# Patient Record
Sex: FEMALE | Race: BLACK OR AFRICAN AMERICAN | NOT HISPANIC OR LATINO | Employment: FULL TIME | URBAN - NONMETROPOLITAN AREA
[De-identification: names, ages, dates, MRNs, and addresses within clinical notes are randomized per-mention and may not be internally consistent; named-entity substitution may affect disease eponyms.]

---

## 2017-02-27 ENCOUNTER — OFFICE VISIT (OUTPATIENT)
Dept: RETAIL CLINIC | Facility: CLINIC | Age: 27
End: 2017-02-27

## 2017-02-27 VITALS
BODY MASS INDEX: 24.12 KG/M2 | OXYGEN SATURATION: 98 % | HEIGHT: 65 IN | DIASTOLIC BLOOD PRESSURE: 72 MMHG | TEMPERATURE: 98.6 F | WEIGHT: 144.8 LBS | RESPIRATION RATE: 18 BRPM | SYSTOLIC BLOOD PRESSURE: 122 MMHG | HEART RATE: 88 BPM

## 2017-02-27 DIAGNOSIS — J10.1 INFLUENZA B: Primary | ICD-10-CM

## 2017-02-27 LAB
EXPIRATION DATE: ABNORMAL
FLUAV AG NPH QL: NEGATIVE
FLUBV AG NPH QL: POSITIVE
INTERNAL CONTROL: ABNORMAL
Lab: ABNORMAL

## 2017-02-27 PROCEDURE — 87804 INFLUENZA ASSAY W/OPTIC: CPT | Performed by: NURSE PRACTITIONER

## 2017-02-27 PROCEDURE — 99213 OFFICE O/P EST LOW 20 MIN: CPT | Performed by: NURSE PRACTITIONER

## 2017-02-27 RX ORDER — DEXTROMETHORPHAN HYDROBROMIDE AND PROMETHAZINE HYDROCHLORIDE 15; 6.25 MG/5ML; MG/5ML
5 SYRUP ORAL NIGHTLY PRN
Qty: 45 ML | Refills: 0 | Status: SHIPPED | OUTPATIENT
Start: 2017-02-27

## 2017-02-27 NOTE — PATIENT INSTRUCTIONS
"Influenza, Adult  Influenza (\"the flu\") is a viral infection of the respiratory tract. It occurs more often in winter months because people spend more time in close contact with one another. Influenza can make you feel very sick. Influenza easily spreads from person to person (contagious).  CAUSES   Influenza is caused by a virus that infects the respiratory tract. You can catch the virus by breathing in droplets from an infected person's cough or sneeze. You can also catch the virus by touching something that was recently contaminated with the virus and then touching your mouth, nose, or eyes.  RISKS AND COMPLICATIONS  You may be at risk for a more severe case of influenza if you smoke cigarettes, have diabetes, have chronic heart disease (such as heart failure) or lung disease (such as asthma), or if you have a weakened immune system. Elderly people and pregnant women are also at risk for more serious infections. The most common problem of influenza is a lung infection (pneumonia). Sometimes, this problem can require emergency medical care and may be life threatening.  SIGNS AND SYMPTOMS   Symptoms typically last 4 to 10 days and may include:  · Fever.  · Chills.  · Headache, body aches, and muscle aches.  · Sore throat.  · Chest discomfort and cough.  · Poor appetite.  · Weakness or feeling tired.  · Dizziness.  · Nausea or vomiting.  DIAGNOSIS   Diagnosis of influenza is often made based on your history and a physical exam. A nose or throat swab test can be done to confirm the diagnosis.  TREATMENT   In mild cases, influenza goes away on its own. Treatment is directed at relieving symptoms. For more severe cases, your health care provider may prescribe antiviral medicines to shorten the sickness. Antibiotic medicines are not effective because the infection is caused by a virus, not by bacteria.  HOME CARE INSTRUCTIONS  · Take medicines only as directed by your health care provider.  · Use a cool mist humidifier " to make breathing easier.  · Get plenty of rest until your temperature returns to normal. This usually takes 3 to 4 days.  · Drink enough fluid to keep your urine clear or pale yellow.  · Cover your mouth and nose when coughing or sneezing, and wash your hands well to prevent the virus from spreading.  · Stay home from work or school until the fever is gone for at least 1 full day.  PREVENTION   An annual influenza vaccination (flu shot) is the best way to avoid getting influenza. An annual flu shot is now routinely recommended for all adults in the [place]  SEEK MEDICAL CARE IF:  · You experience chest pain, your cough worsens, or you produce more mucus.  · You have nausea, vomiting, or diarrhea.  · Your fever returns or gets worse.  SEEK IMMEDIATE MEDICAL CARE IF:  · You have trouble breathing, you become short of breath, or your skin or nails become bluish.  · You have severe pain or stiffness in the neck.  · You develop a sudden headache, or pain in the face or ear.  · You have nausea or vomiting that you cannot control.  MAKE SURE YOU:   · Understand these instructions.  · Will watch your condition.  · Will get help right away if you are not doing well or get worse.     This information is not intended to replace advice given to you by your health care provider. Make sure you discuss any questions you have with your health care provider.     Document Released: 12/15/2001 Document Revised: 01/08/2016 Document Reviewed: 10/11/2016  [employer] Interactive Patient Education ©2016 [employer]    Dextromethorphan; Promethazine oral solution  What is this medicine?  DEXTROMETHORPHAN; PROMETHAZINE (dex troe meth OR fan; proe METH a zeen) is a cough suppressant and an antihistamine. It is used to treat coughing due to colds or allergies. This medicine will not treat an infection.  This medicine may be used for other purposes; ask your health care provider or pharmacist if you have questions.  COMMON BRAND NAME(S): Phen  Cynthia YOUNGER  What should I tell my health care provider before I take this medicine?  They need to know if you have any of the following conditions:  -asthma or other lung disease  -diabetes  -eczema  -seizure disorder  -serious or chronic illness  -sleep apnea  -an unusual or allergic reaction to dextromethorphan, promethazine, phenothiazines, other medicines, foods, dyes, or preservatives  -pregnant or trying to get pregnant  -breast-feeding  How should I use this medicine?  Take this medicine by mouth with a glass of water. Follow the directions on the prescription label. Use a specially marked spoon or container to measure your medicine. Household spoons are not accurate. Take your doses at regular intervals. Do not take your medicine more often than directed.  Talk to your pediatrician regarding the use of this medicine in children. Special care may be needed. Do not use this medicine in children less than 2 years of age.  Patients over 65 years old may have a stronger reaction and need a smaller dose.  Overdosage: If you think you have taken too much of this medicine contact a poison control center or emergency room at once.  NOTE: This medicine is only for you. Do not share this medicine with others.  What if I miss a dose?  If you miss a dose, take it as soon as you can. If it is almost time for your next dose, take only that dose. Do not take double or extra doses.  What may interact with this medicine?  Do not take this medicine with any of the following medications:  -MAOIs like Carbex, Eldepryl, Marplan, Nardil, and Parnate  This medicine may also interact with the following medications:  -alcohol or alcohol-containing products  -barbiturate medicines like phenobarbital  -epinephrine  -medicines for depression, anxiety or psychotic disturbances  -medicines for Parkinson's disease  -medicines for sleep  -medicines for the stomach like metoclopramide, dicyclomine, glycopyrrolate  -pain medicines  -radio  contrast dyes  -sibutramine  -some medicines for cold or allergies  This list may not describe all possible interactions. Give your health care provider a list of all the medicines, herbs, non-prescription drugs, or dietary supplements you use. Also tell them if you smoke, drink alcohol, or use illegal drugs. Some items may interact with your medicine.  What should I watch for while using this medicine?  Tell your doctor if your symptoms do not improve or if they get worse.  You may get drowsy or dizzy. Do not drive, use machinery, or do anything that needs mental alertness until you know how this medicine affects you. Do not stand or sit up quickly, especially if you are an older patient. This reduces the risk of dizzy or fainting spells. Alcohol may interfere with the effect of this medicine. Avoid alcoholic drinks.  This medicine can make you more sensitive to the sun. Keep out of the sun. If you cannot avoid being in the sun, wear protective clothing and use sunscreen. Do not use sun lamps or tanning beds/booths.  What side effects may I notice from receiving this medicine?  Side effects that you should report to your doctor or health care professional as soon as possible:  -allergic reactions like skin rash, itching or hives, swelling of the face, lips, or tongue  -breathing problems  -changes in vision  -confused, disoriented, excitable  -fast, irregular heartbeat  -fever, sweating  -hallucinations  -high or low blood pressure  -lightheaded  -muscle stiffness  -seizures  -tremors, twitches  -yellow eyes or skin  Side effects that usually do not require medical attention (report to your doctor or health care professional if they continue or are bothersome):  -congestion in the nose  -dry mouth  -nausea, vomiting  -stomach upset  -trouble sleeping  This list may not describe all possible side effects. Call your doctor for medical advice about side effects. You may report side effects to FDA at  1-800-FDA-1088.  Where should I keep my medicine?  Keep out of the reach of children.  Store at room temperature between 20 and 25 degrees C (68 and 77 degrees F). Protect from light. Throw away any unused medicine after the expiration date.  NOTE: This sheet is a summary. It may not cover all possible information. If you have questions about this medicine, talk to your doctor, pharmacist, or health care provider.     © 2016, [employer]. (2009-04-06 17:01:49)      Your flu test was positive for flu B.  Due to your symptoms starting several days ago you are not a candidate for Tamiflu.  Continue good fluid intake and rest.  You can continue taking Mucinex Fast Max during the day and take promethazine DM at bedtime.  Use caution when taking promethazine DM as it may make you drowsy.  If you develop high fever not responding to medication, chest pain, trouble breathing or any other severe symptom please go to ER for evaluation.

## 2017-02-27 NOTE — PROGRESS NOTES
Chief Complaint   Patient presents with   • Headache   • Cough   • Nasal Congestion     Started last Wed. but just keeps getting worse   • Chills     Since Sat.    • Generalized Body Aches     Since Sat.    • Fever     Subjective   Lee Irizarry is a 26 y.o. female who presents to the clinic today with complaints of a tickle in her throat and mild cough which started Wednesday which has gradually worsened. She started having fever Thursday.  She is unsure how high her temperature has been.  She has had close contacts with the flu.     She has been taking Mucinex Fast -Max every four hours which has been helping her symptoms. She is having trouble sleeping due to cough.       The following portions of the patient's history were reviewed and updated as appropriate: allergies, past family history, past medical history, past social history, past surgical history and problem list.    Current Outpatient Prescriptions:   •  cetirizine (zyrTEC) 10 MG tablet, Take 10 mg by mouth Daily., Disp: , Rfl:   •  fluticasone (FLONASE) 50 MCG/ACT nasal spray, 2 sprays into each nostril Daily., Disp: , Rfl:   •  montelukast (SINGULAIR) 10 MG tablet, Take 10 mg by mouth Every Night., Disp: , Rfl:   •  loratadine-pseudoephedrine (CLARITIN-D 24 HOUR)  MG per 24 hr tablet, Take 1 tablet by mouth Daily., Disp: 7 tablet, Rfl: 0    Allergies:  Amoxicillin  Past Medical History   Diagnosis Date   • Allergic      History reviewed. No pertinent past surgical history.  Family History   Problem Relation Age of Onset   • Hypertension Mother    • Asthma Mother    • Hypertension Father      Social History   Substance Use Topics   • Smoking status: Never Smoker   • Smokeless tobacco: Never Used   • Alcohol use No       Review of Systems  Review of Systems   Constitutional: Positive for chills, fatigue and fever.   HENT: Positive for postnasal drip and rhinorrhea (clear). Negative for ear pain and sore throat.    Eyes: Negative for  "photophobia, pain, discharge, itching and visual disturbance.   Respiratory: Positive for cough (productive cough, yellow.). Negative for shortness of breath and wheezing.    Cardiovascular: Negative for chest pain.   Gastrointestinal: Negative for abdominal pain, constipation, diarrhea (soft stool), nausea and vomiting.   Musculoskeletal: Positive for arthralgias.   Neurological: Positive for headaches.   Psychiatric/Behavioral: Negative for confusion.       Objective   Visit Vitals   • /72 (BP Location: Right arm, Patient Position: Sitting, Cuff Size: Adult)   • Pulse 88   • Temp 98.6 °F (37 °C) (Oral)   • Resp 18   • Ht 65\" (165.1 cm)   • Wt 144 lb 12.8 oz (65.7 kg)   • LMP 02/08/2017 (Approximate)   • SpO2 98%   • BMI 24.1 kg/m2     Last 2 weights    02/27/17  1508   Weight: 144 lb 12.8 oz (65.7 kg)       Physical Exam   Constitutional: She is oriented to person, place, and time. She appears well-developed and well-nourished. She is cooperative.   HENT:   Head: Normocephalic and atraumatic.   Right Ear: Tympanic membrane, external ear and ear canal normal. Tympanic membrane is not perforated, not erythematous, not retracted and not bulging.   Left Ear: Tympanic membrane, external ear and ear canal normal. Tympanic membrane is not perforated, not erythematous, not retracted and not bulging.   Nose: Mucosal edema present. Right sinus exhibits no maxillary sinus tenderness and no frontal sinus tenderness. Left sinus exhibits no maxillary sinus tenderness and no frontal sinus tenderness.   Mouth/Throat: Uvula is midline, oropharynx is clear and moist and mucous membranes are normal. Posterior oropharyngeal erythema: mild cobblestoning and clear post nasal drip noted. Tonsils are 1+ on the right. Tonsils are 1+ on the left. No tonsillar exudate.   Eyes: Conjunctivae, EOM and lids are normal. Pupils are equal, round, and reactive to light.   Neck: Trachea normal and normal range of motion. Neck supple. "   Cardiovascular: Normal rate, regular rhythm, S1 normal, S2 normal and normal heart sounds.    Pulmonary/Chest: Effort normal and breath sounds normal. She has no wheezes. She has no rhonchi. She has no rales. Chest wall is not dull to percussion. She exhibits no tenderness.   Abdominal: Soft. Bowel sounds are normal. There is no tenderness. There is no rigidity, no rebound and no guarding.   Lymphadenopathy:     She has cervical adenopathy (small soft mobile anterior cervical nodes).   Neurological: She is alert and oriented to person, place, and time. Coordination and gait normal.   Skin: Skin is warm, dry and intact.   Psychiatric: She has a normal mood and affect. Her speech is normal and behavior is normal.       Assessment/Plan     Lee was seen today for headache, cough, nasal congestion, chills, generalized body aches and fever.    Diagnoses and all orders for this visit:    Influenza B  -     POC Influenza A / B- positive flu B    Your flu test was positive for flu B.  Due to your symptoms starting several days ago you are not a candidate for Tamiflu.  Continue good fluid intake and rest.  You can continue taking Mucinex Fast Max during the day and take promethazine DM at bedtime.  Use caution when taking promethazine DM as it may make you drowsy.  If you develop high fever not responding to medication, chest pain, trouble breathing or any other severe symptom please go to ER for evaluation.

## 2021-10-24 ENCOUNTER — HOSPITAL ENCOUNTER (EMERGENCY)
Facility: HOSPITAL | Age: 31
Discharge: HOME | End: 2021-10-24
Attending: EMERGENCY MEDICINE | Admitting: EMERGENCY MEDICINE

## 2021-10-24 VITALS
TEMPERATURE: 99 F | RESPIRATION RATE: 16 BRPM | HEART RATE: 71 BPM | HEIGHT: 66 IN | BODY MASS INDEX: 22.5 KG/M2 | SYSTOLIC BLOOD PRESSURE: 132 MMHG | DIASTOLIC BLOOD PRESSURE: 104 MMHG | OXYGEN SATURATION: 100 % | WEIGHT: 140 LBS

## 2021-10-24 DIAGNOSIS — K08.89 PAIN, DENTAL: Primary | ICD-10-CM

## 2021-10-24 DIAGNOSIS — R51.9 ACUTE NONINTRACTABLE HEADACHE, UNSPECIFIED HEADACHE TYPE: ICD-10-CM

## 2021-10-24 PROCEDURE — 96372 THER/PROPH/DIAG INJ SC/IM: CPT

## 2021-10-24 PROCEDURE — 99283 EMERGENCY DEPT VISIT LOW MDM: CPT

## 2021-10-24 PROCEDURE — 25010000002 KETOROLAC TROMETHAMINE PER 15 MG: Performed by: EMERGENCY MEDICINE

## 2021-10-24 RX ORDER — KETOROLAC TROMETHAMINE 30 MG/ML
30 INJECTION, SOLUTION INTRAMUSCULAR; INTRAVENOUS ONCE
Status: COMPLETED | OUTPATIENT
Start: 2021-10-24 | End: 2021-10-24

## 2021-10-24 RX ORDER — CHLORHEXIDINE GLUCONATE 0.12 MG/ML
15 RINSE ORAL 4 TIMES DAILY
Qty: 300 ML | Refills: 0 | Status: SHIPPED | OUTPATIENT
Start: 2021-10-24 | End: 2021-10-29

## 2021-10-24 RX ORDER — CLINDAMYCIN HYDROCHLORIDE 300 MG/1
300 CAPSULE ORAL EVERY 6 HOURS
Qty: 28 CAPSULE | Refills: 0 | Status: SHIPPED | OUTPATIENT
Start: 2021-10-24 | End: 2021-10-31

## 2021-10-24 RX ORDER — HYDROCODONE BITARTRATE AND ACETAMINOPHEN 5; 325 MG/1; MG/1
1 TABLET ORAL ONCE
Status: COMPLETED | OUTPATIENT
Start: 2021-10-24 | End: 2021-10-24

## 2021-10-24 RX ADMIN — KETOROLAC TROMETHAMINE 30 MG: 30 INJECTION, SOLUTION INTRAMUSCULAR; INTRAVENOUS at 15:44

## 2021-10-24 RX ADMIN — HYDROCODONE BITARTRATE AND ACETAMINOPHEN 1 TABLET: 5; 325 TABLET ORAL at 15:45

## 2021-10-24 NOTE — ED PROVIDER NOTES
Emergency Medicine Provider Note    Subjective:    HISTORY OF PRESENT ILLNESS     This is a very pleasant 30 y.o. female with a past medical history of seasonal allergies who presents to the emergency department today with a chief complaint of dental pain and headache. Gradual in onset over the past two weeks. Intermittent, moderate, throbbing, no exacerbating or relieving factors and no associated symptoms. She denies trauma. She has no vision changes. She has no neck pain or stiffness. She has no fevers, chills, nausea, vomiting, numbness, weakness, or paresthesias. She denies any trouble swallowing, she has no voice changes.           History is obtained from the patient.       Review of Systems: All other systems are reviewed and are negative other than noted in the HPI.    Past Medical History:  Past Medical History:   Diagnosis Date   • Seasonal allergies        Allergies:  Allergies   Allergen Reactions   • Amoxicillin Other (See Comments)     unknown       Past Surgical History:  No past surgical history on file.    Family History:  Family History   Problem Relation Age of Onset   • Hypertension Mother    • Asthma Mother    • Hypertension Father        Social History:  Social History     Socioeconomic History   • Marital status: Single   Tobacco Use   • Smoking status: Never Smoker   • Smokeless tobacco: Never Used   Substance and Sexual Activity   • Alcohol use: No       Home Medications:  Prior to Admission medications    Medication Sig Start Date End Date Taking? Authorizing Provider   cetirizine (zyrTEC) 10 MG tablet Take 10 mg by mouth Daily.    Emergency, Nurse Mohinder, RN   chlorhexidine (PERIDEX) 0.12 % solution Apply 15 mL to the mouth or throat 4 (Four) Times a Day for 5 days. 10/24/21 10/29/21  Sid Chiu MD   clindamycin (CLEOCIN) 300 MG capsule Take 1 capsule by mouth Every 6 (Six) Hours for 7 days. 10/24/21 10/31/21  Sid Chiu MD   fluticasone (FLONASE) 50  MCG/ACT nasal spray 2 sprays into each nostril Daily.    Emergency, Nurse Epic, RN   montelukast (SINGULAIR) 10 MG tablet Take 10 mg by mouth Every Night.    Emergency, Nurse Epic, RN   naproxen (NAPROSYN) 500 MG tablet Take 1 tablet by mouth 2 (Two) Times a Day As Needed for Mild Pain (1-3). 4/12/17   Rosamaria Young APRN   promethazine-dextromethorphan (PROMETHAZINE-DM) 6.25-15 MG/5ML syrup Take 5 mL by mouth At Night As Needed for cough. 2/27/17   Kathleen Freeman APRN         Objective:    PHYSICAL EXAM     Vitals:   Vitals:    10/24/21 1603   BP: (!) 132/104   Pulse: 71   Resp: 16   Temp: 99 °F (37.2 °C)   SpO2: 100%     GENERAL: Well appearing, in no acute distress.   HEENT: Moist mucous membranes, oropharynx clear without lesions, exudates, thrush. There is TTP of the L maxillary posterior molar that reproduces the patients headache. No submandibular, sublingual, or submental swelling. No facial swelling or erythema. Speaking in full sentences. Tolerating secretions without any difficulty.   EYES: No scleral icterus, conjunctivae clear.   NECK: No cervical lymphadenopathy, no stiffness.  CARDIAC: Normal rate, regular rhythm, no murmurs, 2+ peripheral pulses in all four extremities, normal capillary refill.   PULMONARY: Normal work of breathing on room air, lungs are clear to auscultation bilaterally without wheezes, crackles, rhonchi.  ABDOMINAL: Normal bowel sounds, abdomen is soft, non-tender, non-distended, no hepatomegaly or splenomegaly.   MUSCULOSKELETAL: Normal range of motion, no lower extremity edema.  NEUROLOGIC: Alert and oriented x 3, no neck stiffness. EOMI, PERRL, 5/5 strength in all four extremities, normal sensation to light touch in all four extremities.   SKIN: Warm and dry without rashes.   PSYCHIATRIC: Mood and affect are normal.     PROCEDURES     Procedures    LAB AND RADIOLOGY RESULTS     Lab Results (last 24 hours)     ** No results found for the last 24 hours. **          No  results found.    ED course:    Medications   ketorolac (TORADOL) injection 30 mg (30 mg Intramuscular Given 10/24/21 1549)   HYDROcodone-acetaminophen (NORCO) 5-325 MG per tablet 1 tablet (1 tablet Oral Given 10/24/21 0735)          Amount and/or complexity of data reviewed:        Risk of significant complications, morbidity, and/or mortality.    •  Presenting problem: moderate  •  Diagnostic procedures: moderate  •  Management options: moderate        MEDICAL DECISION MAKING     Patient presents with headache and dental pain. Upon arrival to the Emergency Department the patient is in NAD and VSS. She is given toradol and norco for pain control after denying a history of kidney disease and confirming she is not pregnant. Low concern for intracranial mass with reassuring history and exam. Low concern for SAH with no neck stiffness, no syncope, no onset during exertion. Low concern for meningitis with no fevers, chills, rashes, or neck stiffness. Low concern for traumatic bleed with a reassuring exam and no history of trauma. Low concern for IIH with no vision changes. Low concern for quan's angina with no submandibular, sublingual, or submental swelling. She has no signs of facial cellulitis, orbital cellulitis, or cavernous sinus thrombus. She has no signs of airway obstruction. I have explained she needs to get rapid dental follow up and she verbalizes understanding of this.. I explained that there is always diagnostic uncertainty in the ER and this could be an early presentation of a process not detected at this time so she should return for any new or worsening symptoms and should follow up rapidly with her primary care provider for further evaluation and management. she is discharged in good condition with reassuring vitals and is given commonsense return precautions which she verbalizes understanding of.          Diagnosis:    Final diagnoses:   Pain, dental   Acute nonintractable headache, unspecified  headache type         ED Disposition:     ED Disposition     ED Disposition Condition Comment    Discharge Stable           Provider, No Known  [facility]  [place] [id number]  [phone]    Schedule an appointment as soon as possible for a visit in 2 days           Medication List      New Prescriptions    chlorhexidine 0.12 % solution  Commonly known as: PERIDEX  Apply 15 mL to the mouth or throat 4 (Four) Times a Day for 5 days.     clindamycin 300 MG capsule  Commonly known as: CLEOCIN  Take 1 capsule by mouth Every 6 (Six) Hours for 7 days.           Where to Get Your Medications      You can get these medications from any pharmacy    Bring a paper prescription for each of these medications  · chlorhexidine 0.12 % solution  · clindamycin 300 MG capsule              Sid Chiu MD  10/26/21 2949

## 2023-04-10 PROCEDURE — 87661 TRICHOMONAS VAGINALIS AMPLIF: CPT | Performed by: NURSE PRACTITIONER

## 2023-04-10 PROCEDURE — 87086 URINE CULTURE/COLONY COUNT: CPT | Performed by: NURSE PRACTITIONER

## 2023-04-10 PROCEDURE — 87591 N.GONORRHOEAE DNA AMP PROB: CPT | Performed by: NURSE PRACTITIONER

## 2023-04-10 PROCEDURE — 87147 CULTURE TYPE IMMUNOLOGIC: CPT | Performed by: NURSE PRACTITIONER

## 2023-04-10 PROCEDURE — 87491 CHLMYD TRACH DNA AMP PROBE: CPT | Performed by: NURSE PRACTITIONER

## 2023-04-12 ENCOUNTER — TELEPHONE (OUTPATIENT)
Dept: URGENT CARE | Facility: CLINIC | Age: 33
End: 2023-04-12
Payer: COMMERCIAL

## 2023-04-12 DIAGNOSIS — A49.1 STREPTOCOCCUS GROUP B INFECTION: Primary | ICD-10-CM

## 2023-04-12 RX ORDER — CEFDINIR 300 MG/1
300 CAPSULE ORAL 2 TIMES DAILY
Qty: 14 CAPSULE | Refills: 0 | Status: SHIPPED | OUTPATIENT
Start: 2023-04-12 | End: 2023-04-12

## 2023-04-12 RX ORDER — CEFDINIR 300 MG/1
300 CAPSULE ORAL 2 TIMES DAILY
Qty: 14 CAPSULE | Refills: 0 | Status: SHIPPED | OUTPATIENT
Start: 2023-04-12 | End: 2023-04-19

## 2023-05-11 ENCOUNTER — HOSPITAL ENCOUNTER (OUTPATIENT)
Dept: GENERAL RADIOLOGY | Facility: HOSPITAL | Age: 33
Discharge: HOME | End: 2023-05-11
Payer: COMMERCIAL

## 2023-05-11 PROCEDURE — 72070 X-RAY EXAM THORAC SPINE 2VWS: CPT

## 2023-05-11 PROCEDURE — 73030 X-RAY EXAM OF SHOULDER: CPT

## 2023-05-11 PROCEDURE — 72040 X-RAY EXAM NECK SPINE 2-3 VW: CPT

## 2023-05-11 PROCEDURE — 72100 X-RAY EXAM L-S SPINE 2/3 VWS: CPT

## 2024-07-01 PROCEDURE — 86695 HERPES SIMPLEX TYPE 1 TEST: CPT

## 2024-07-01 PROCEDURE — 87591 N.GONORRHOEAE DNA AMP PROB: CPT

## 2024-07-01 PROCEDURE — G0432 EIA HIV-1/HIV-2 SCREEN: HCPCS

## 2024-07-01 PROCEDURE — 86696 HERPES SIMPLEX TYPE 2 TEST: CPT

## 2024-07-01 PROCEDURE — 87491 CHLMYD TRACH DNA AMP PROBE: CPT

## 2024-07-01 PROCEDURE — 87661 TRICHOMONAS VAGINALIS AMPLIF: CPT

## 2024-07-01 PROCEDURE — 86780 TREPONEMA PALLIDUM: CPT

## 2024-09-20 ENCOUNTER — HOSPITAL ENCOUNTER (EMERGENCY)
Facility: HOSPITAL | Age: 34
Discharge: HOME | End: 2024-09-20
Payer: COMMERCIAL

## 2024-09-20 VITALS
WEIGHT: 139 LBS | SYSTOLIC BLOOD PRESSURE: 109 MMHG | HEART RATE: 74 BPM | OXYGEN SATURATION: 100 % | TEMPERATURE: 98 F | BODY MASS INDEX: 23.16 KG/M2 | DIASTOLIC BLOOD PRESSURE: 71 MMHG | RESPIRATION RATE: 14 BRPM | HEIGHT: 65 IN

## 2024-09-20 DIAGNOSIS — Z3A.01 LESS THAN 8 WEEKS GESTATION OF PREGNANCY: ICD-10-CM

## 2024-09-20 DIAGNOSIS — N39.0 URINARY TRACT INFECTION WITHOUT HEMATURIA, SITE UNSPECIFIED: ICD-10-CM

## 2024-09-20 DIAGNOSIS — R11.0 NAUSEA: Primary | ICD-10-CM

## 2024-09-20 LAB
ALBUMIN SERPL-MCNC: 4.4 G/DL (ref 3.5–5.2)
ALBUMIN/GLOB SERPL: 1.1 G/DL
ALP SERPL-CCNC: 42 U/L (ref 39–117)
ALT SERPL W P-5'-P-CCNC: 6 U/L (ref 1–33)
ANION GAP SERPL CALCULATED.3IONS-SCNC: 12 MMOL/L (ref 5–15)
AST SERPL-CCNC: 13 U/L (ref 1–32)
BACTERIA UR QL AUTO: ABNORMAL /HPF
BASOPHILS # BLD MANUAL: 0 10*3/MM3 (ref 0–0.2)
BASOPHILS NFR BLD MANUAL: 0 % (ref 0–1.5)
BILIRUB SERPL-MCNC: 0.6 MG/DL (ref 0–1.2)
BILIRUB UR QL STRIP: NEGATIVE
BUN SERPL-MCNC: 8 MG/DL (ref 6–20)
BUN/CREAT SERPL: 17 (ref 7–25)
CALCIUM SPEC-SCNC: 9 MG/DL (ref 8.6–10.5)
CHLORIDE SERPL-SCNC: 101 MMOL/L (ref 98–107)
CLARITY UR: ABNORMAL
CLUMPED PLATELETS: ABNORMAL
CO2 SERPL-SCNC: 22 MMOL/L (ref 22–29)
COLOR UR: ABNORMAL
CREAT SERPL-MCNC: 0.47 MG/DL (ref 0.57–1)
DEPRECATED RDW RBC AUTO: 38.1 FL (ref 37–54)
EGFRCR SERPLBLD CKD-EPI 2021: 130.7 ML/MIN/1.73
EOSINOPHIL # BLD MANUAL: 0 10*3/MM3 (ref 0–0.4)
EOSINOPHIL NFR BLD MANUAL: 0 % (ref 0.3–6.2)
ERYTHROCYTE [DISTWIDTH] IN BLOOD BY AUTOMATED COUNT: 12.6 % (ref 12.3–15.4)
GLOBULIN UR ELPH-MCNC: 3.9 GM/DL
GLUCOSE SERPL-MCNC: 84 MG/DL (ref 65–99)
GLUCOSE UR STRIP-MCNC: NEGATIVE MG/DL
HCG INTACT+B SERPL-ACNC: NORMAL MIU/ML
HCT VFR BLD AUTO: 35.9 % (ref 34–46.6)
HGB BLD-MCNC: 12.5 G/DL (ref 12–15.9)
HGB UR QL STRIP.AUTO: NEGATIVE
HYALINE CASTS UR QL AUTO: ABNORMAL /LPF
KETONES UR QL STRIP: ABNORMAL
LEUKOCYTE ESTERASE UR QL STRIP.AUTO: ABNORMAL
LYMPHOCYTES # BLD MANUAL: 1.43 10*3/MM3 (ref 0.7–3.1)
LYMPHOCYTES NFR BLD MANUAL: 4 % (ref 5–12)
MAGNESIUM SERPL-MCNC: 2 MG/DL (ref 1.6–2.6)
MCH RBC QN AUTO: 29.3 PG (ref 26.6–33)
MCHC RBC AUTO-ENTMCNC: 34.8 G/DL (ref 31.5–35.7)
MCV RBC AUTO: 84.1 FL (ref 79–97)
MONOCYTES # BLD: 0.44 10*3/MM3 (ref 0.1–0.9)
NEUTROPHILS # BLD AUTO: 9.13 10*3/MM3 (ref 1.7–7)
NEUTROPHILS NFR BLD MANUAL: 80 % (ref 42.7–76)
NEUTS BAND NFR BLD MANUAL: 3 % (ref 0–5)
NITRITE UR QL STRIP: NEGATIVE
PH UR STRIP.AUTO: 6.5 [PH] (ref 5–8)
PLAT MORPH BLD: NORMAL
PLATELET # BLD AUTO: 322 10*3/MM3 (ref 140–450)
PMV BLD AUTO: 11.1 FL (ref 6–12)
POIKILOCYTOSIS BLD QL SMEAR: ABNORMAL
POTASSIUM SERPL-SCNC: 3.5 MMOL/L (ref 3.5–5.2)
PROT SERPL-MCNC: 8.3 G/DL (ref 6–8.5)
PROT UR QL STRIP: ABNORMAL
RBC # BLD AUTO: 4.27 10*6/MM3 (ref 3.77–5.28)
RBC # UR STRIP: ABNORMAL /HPF
REF LAB TEST METHOD: ABNORMAL
SODIUM SERPL-SCNC: 135 MMOL/L (ref 136–145)
SP GR UR STRIP: >=1.03 (ref 1–1.03)
SQUAMOUS #/AREA URNS HPF: ABNORMAL /HPF
TSH SERPL DL<=0.05 MIU/L-ACNC: 0.53 UIU/ML (ref 0.27–4.2)
UROBILINOGEN UR QL STRIP: ABNORMAL
VARIANT LYMPHS NFR BLD MANUAL: 1 % (ref 0–5)
VARIANT LYMPHS NFR BLD MANUAL: 12 % (ref 19.6–45.3)
WBC # UR STRIP: ABNORMAL /HPF
WBC MORPH BLD: NORMAL
WBC NRBC COR # BLD AUTO: 11 10*3/MM3 (ref 3.4–10.8)

## 2024-09-20 PROCEDURE — 36415 COLL VENOUS BLD VENIPUNCTURE: CPT

## 2024-09-20 PROCEDURE — 87086 URINE CULTURE/COLONY COUNT: CPT

## 2024-09-20 PROCEDURE — 80050 GENERAL HEALTH PANEL: CPT

## 2024-09-20 PROCEDURE — 83735 ASSAY OF MAGNESIUM: CPT

## 2024-09-20 PROCEDURE — 85007 BL SMEAR W/DIFF WBC COUNT: CPT

## 2024-09-20 PROCEDURE — 25810000003 SODIUM CHLORIDE 0.9 % SOLUTION

## 2024-09-20 PROCEDURE — 84702 CHORIONIC GONADOTROPIN TEST: CPT

## 2024-09-20 PROCEDURE — 99283 EMERGENCY DEPT VISIT LOW MDM: CPT

## 2024-09-20 PROCEDURE — 81001 URINALYSIS AUTO W/SCOPE: CPT

## 2024-09-20 PROCEDURE — 25010000002 ONDANSETRON PER 1 MG

## 2024-09-20 PROCEDURE — 96374 THER/PROPH/DIAG INJ IV PUSH: CPT

## 2024-09-20 RX ORDER — SODIUM CHLORIDE 0.9 % (FLUSH) 0.9 %
10 SYRINGE (ML) INJECTION AS NEEDED
Status: DISCONTINUED | OUTPATIENT
Start: 2024-09-20 | End: 2024-09-20 | Stop reason: HOSPADM

## 2024-09-20 RX ORDER — ONDANSETRON 2 MG/ML
4 INJECTION INTRAMUSCULAR; INTRAVENOUS ONCE
Status: COMPLETED | OUTPATIENT
Start: 2024-09-20 | End: 2024-09-20

## 2024-09-20 RX ORDER — CEFDINIR 300 MG/1
300 CAPSULE ORAL 2 TIMES DAILY
Qty: 14 CAPSULE | Refills: 0 | Status: SHIPPED | OUTPATIENT
Start: 2024-09-20 | End: 2024-09-27

## 2024-09-20 RX ADMIN — ONDANSETRON 4 MG: 2 INJECTION INTRAMUSCULAR; INTRAVENOUS at 10:43

## 2024-09-20 RX ADMIN — SODIUM CHLORIDE 1000 ML: 9 INJECTION, SOLUTION INTRAVENOUS at 10:43

## 2024-09-21 LAB — BACTERIA SPEC AEROBE CULT: NORMAL

## 2024-10-20 ENCOUNTER — APPOINTMENT (OUTPATIENT)
Dept: ULTRASOUND IMAGING | Facility: HOSPITAL | Age: 34
End: 2024-10-20
Payer: COMMERCIAL

## 2024-10-20 ENCOUNTER — HOSPITAL ENCOUNTER (EMERGENCY)
Facility: HOSPITAL | Age: 34
Discharge: HOME | End: 2024-10-20
Admitting: EMERGENCY MEDICINE
Payer: COMMERCIAL

## 2024-10-20 VITALS
BODY MASS INDEX: 23.54 KG/M2 | SYSTOLIC BLOOD PRESSURE: 123 MMHG | WEIGHT: 146.5 LBS | DIASTOLIC BLOOD PRESSURE: 77 MMHG | TEMPERATURE: 98.1 F | OXYGEN SATURATION: 99 % | RESPIRATION RATE: 16 BRPM | HEART RATE: 107 BPM | HEIGHT: 66 IN

## 2024-10-20 DIAGNOSIS — N39.0 ACUTE UTI: ICD-10-CM

## 2024-10-20 DIAGNOSIS — N93.9 VAGINAL BLEEDING: Primary | ICD-10-CM

## 2024-10-20 DIAGNOSIS — Z3A.12 12 WEEKS GESTATION OF PREGNANCY: ICD-10-CM

## 2024-10-20 LAB
ABO GROUP BLD: NORMAL
ALBUMIN SERPL-MCNC: 4.2 G/DL (ref 3.5–5.2)
ALBUMIN/GLOB SERPL: 1 G/DL
ALP SERPL-CCNC: 50 U/L (ref 39–117)
ALT SERPL W P-5'-P-CCNC: 9 U/L (ref 1–33)
ANION GAP SERPL CALCULATED.3IONS-SCNC: 12 MMOL/L (ref 5–15)
ANISOCYTOSIS BLD QL: ABNORMAL
AST SERPL-CCNC: 16 U/L (ref 1–32)
BACTERIA UR QL AUTO: ABNORMAL /HPF
BASOPHILS # BLD MANUAL: 0 10*3/MM3 (ref 0–0.2)
BASOPHILS NFR BLD MANUAL: 0 % (ref 0–1.5)
BILIRUB SERPL-MCNC: 0.5 MG/DL (ref 0–1.2)
BILIRUB UR QL STRIP: NEGATIVE
BLD GP AB SCN SERPL QL: NEGATIVE
BUN SERPL-MCNC: 7 MG/DL (ref 6–20)
BUN/CREAT SERPL: 16.3 (ref 7–25)
CALCIUM SPEC-SCNC: 9.5 MG/DL (ref 8.6–10.5)
CHLORIDE SERPL-SCNC: 100 MMOL/L (ref 98–107)
CLARITY UR: CLEAR
CO2 SERPL-SCNC: 23 MMOL/L (ref 22–29)
COLOR UR: YELLOW
CREAT SERPL-MCNC: 0.43 MG/DL (ref 0.57–1)
DEPRECATED RDW RBC AUTO: 39.8 FL (ref 37–54)
EGFRCR SERPLBLD CKD-EPI 2021: 133.5 ML/MIN/1.73
EOSINOPHIL # BLD MANUAL: 0.51 10*3/MM3 (ref 0–0.4)
EOSINOPHIL NFR BLD MANUAL: 3.8 % (ref 0.3–6.2)
ERYTHROCYTE [DISTWIDTH] IN BLOOD BY AUTOMATED COUNT: 12.8 % (ref 12.3–15.4)
GLOBULIN UR ELPH-MCNC: 4.1 GM/DL
GLUCOSE SERPL-MCNC: 68 MG/DL (ref 65–99)
GLUCOSE UR STRIP-MCNC: NEGATIVE MG/DL
HCG INTACT+B SERPL-ACNC: NORMAL MIU/ML
HCT VFR BLD AUTO: 35.3 % (ref 34–46.6)
HGB BLD-MCNC: 12 G/DL (ref 12–15.9)
HGB UR QL STRIP.AUTO: NEGATIVE
HYALINE CASTS UR QL AUTO: ABNORMAL /LPF
KETONES UR QL STRIP: NEGATIVE
LEUKOCYTE ESTERASE UR QL STRIP.AUTO: ABNORMAL
LYMPHOCYTES # BLD MANUAL: 1.43 10*3/MM3 (ref 0.7–3.1)
LYMPHOCYTES NFR BLD MANUAL: 1 % (ref 5–12)
MACROCYTES BLD QL SMEAR: ABNORMAL
MCH RBC QN AUTO: 28.8 PG (ref 26.6–33)
MCHC RBC AUTO-ENTMCNC: 34 G/DL (ref 31.5–35.7)
MCV RBC AUTO: 84.7 FL (ref 79–97)
MONOCYTES # BLD: 0.13 10*3/MM3 (ref 0.1–0.9)
NEUTROPHILS # BLD AUTO: 11.39 10*3/MM3 (ref 1.7–7)
NEUTROPHILS NFR BLD MANUAL: 78.8 % (ref 42.7–76)
NEUTS BAND NFR BLD MANUAL: 5.8 % (ref 0–5)
NITRITE UR QL STRIP: NEGATIVE
NRBC BLD AUTO-RTO: 0 /100 WBC (ref 0–0.2)
NUMBER OF DOSES: NORMAL
PH UR STRIP.AUTO: 6.5 [PH] (ref 5–8)
PLAT MORPH BLD: NORMAL
PLATELET # BLD AUTO: 269 10*3/MM3 (ref 140–450)
PMV BLD AUTO: 10.7 FL (ref 6–12)
POTASSIUM SERPL-SCNC: 3.4 MMOL/L (ref 3.5–5.2)
PROT SERPL-MCNC: 8.3 G/DL (ref 6–8.5)
PROT UR QL STRIP: NEGATIVE
RBC # BLD AUTO: 4.17 10*6/MM3 (ref 3.77–5.28)
RBC # UR STRIP: ABNORMAL /HPF
REF LAB TEST METHOD: ABNORMAL
RH BLD: POSITIVE
SODIUM SERPL-SCNC: 135 MMOL/L (ref 136–145)
SP GR UR STRIP: 1.01 (ref 1–1.03)
SQUAMOUS #/AREA URNS HPF: ABNORMAL /HPF
UROBILINOGEN UR QL STRIP: ABNORMAL
VARIANT LYMPHS NFR BLD MANUAL: 10.6 % (ref 19.6–45.3)
WBC # UR STRIP: ABNORMAL /HPF
WBC MORPH BLD: NORMAL
WBC NRBC COR # BLD AUTO: 13.46 10*3/MM3 (ref 3.4–10.8)

## 2024-10-20 PROCEDURE — 81001 URINALYSIS AUTO W/SCOPE: CPT | Performed by: PHYSICIAN ASSISTANT

## 2024-10-20 PROCEDURE — 85007 BL SMEAR W/DIFF WBC COUNT: CPT | Performed by: PHYSICIAN ASSISTANT

## 2024-10-20 PROCEDURE — 87086 URINE CULTURE/COLONY COUNT: CPT | Performed by: PHYSICIAN ASSISTANT

## 2024-10-20 PROCEDURE — 99284 EMERGENCY DEPT VISIT MOD MDM: CPT

## 2024-10-20 PROCEDURE — 86901 BLOOD TYPING SEROLOGIC RH(D): CPT | Performed by: PHYSICIAN ASSISTANT

## 2024-10-20 PROCEDURE — 84702 CHORIONIC GONADOTROPIN TEST: CPT | Performed by: PHYSICIAN ASSISTANT

## 2024-10-20 PROCEDURE — 96365 THER/PROPH/DIAG IV INF INIT: CPT

## 2024-10-20 PROCEDURE — 80053 COMPREHEN METABOLIC PANEL: CPT | Performed by: PHYSICIAN ASSISTANT

## 2024-10-20 PROCEDURE — 25010000002 CEFTRIAXONE PER 250 MG: Performed by: PHYSICIAN ASSISTANT

## 2024-10-20 PROCEDURE — 25810000003 SODIUM CHLORIDE 0.9 % SOLUTION: Performed by: PHYSICIAN ASSISTANT

## 2024-10-20 PROCEDURE — 86850 RBC ANTIBODY SCREEN: CPT | Performed by: PHYSICIAN ASSISTANT

## 2024-10-20 PROCEDURE — 86900 BLOOD TYPING SEROLOGIC ABO: CPT | Performed by: PHYSICIAN ASSISTANT

## 2024-10-20 PROCEDURE — 76801 OB US < 14 WKS SINGLE FETUS: CPT

## 2024-10-20 PROCEDURE — 85025 COMPLETE CBC W/AUTO DIFF WBC: CPT | Performed by: PHYSICIAN ASSISTANT

## 2024-10-20 RX ORDER — CEFDINIR 300 MG/1
300 CAPSULE ORAL 2 TIMES DAILY
Qty: 14 CAPSULE | Refills: 0 | Status: SHIPPED | OUTPATIENT
Start: 2024-10-20

## 2024-10-20 RX ORDER — SODIUM CHLORIDE 0.9 % (FLUSH) 0.9 %
10 SYRINGE (ML) INJECTION AS NEEDED
Status: DISCONTINUED | OUTPATIENT
Start: 2024-10-20 | End: 2024-10-20 | Stop reason: HOSPADM

## 2024-10-20 RX ORDER — POTASSIUM CHLORIDE 1500 MG/1
20 TABLET, EXTENDED RELEASE ORAL ONCE
Status: COMPLETED | OUTPATIENT
Start: 2024-10-20 | End: 2024-10-20

## 2024-10-20 RX ADMIN — POTASSIUM CHLORIDE 20 MEQ: 1500 TABLET, EXTENDED RELEASE ORAL at 15:10

## 2024-10-20 RX ADMIN — SODIUM CHLORIDE 1000 MG: 900 INJECTION INTRAVENOUS at 16:52

## 2024-10-20 RX ADMIN — SODIUM CHLORIDE 1000 ML: 9 INJECTION, SOLUTION INTRAVENOUS at 15:10

## 2024-10-20 NOTE — ED PROVIDER NOTES
Subjective   History of Present Illness    Patient is a pleasant 33-year-old female who presents to ED with mother.  Chief complaint is vaginal bleeding.    Patient describes that she is 11 weeks in gestation.  She is G1, PA 0, miscarriage 0.  She is unsure of her blood type.  She describes that she went to use the restroom today and when she wiped, she noted a scant amount of vaginal bleeding on the toilet paper.  She denies any bleeding since and that she is aware of.  She denies any blood in her stool or any other orifice.  She denies intercourse in the past 24 hours.  She denies any associated fever, nausea or vomiting.  This concerned her so she can the ER to be further evaluated.    Patient is under the care of OB/GYN, janna Pool at [facility].  She was last seen she believes last week and had a normal evaluations.  She is unaware if she had a recent hCG quant.  She denies any recent illnesses or trauma.    Review of Systems   Constitutional:  Negative for activity change.   HENT: Negative.     Respiratory: Negative.     Cardiovascular: Negative.    Gastrointestinal:  Negative for abdominal pain.   Genitourinary:  Positive for vaginal bleeding. Negative for pelvic pain.   Musculoskeletal: Negative.    Neurological: Negative.    Psychiatric/Behavioral: Negative.     All other systems reviewed and are negative.      Past Medical History:   Diagnosis Date    Seasonal allergies        Allergies   Allergen Reactions    Amoxicillin Other (See Comments)     unknown       History reviewed. No pertinent surgical history.    Family History   Problem Relation Age of Onset    Hypertension Mother     Asthma Mother     Hypertension Father        Social History     Socioeconomic History    Marital status: Single   Tobacco Use    Smoking status: Never    Smokeless tobacco: Never   Vaping Use    Vaping status: Never Used   Substance and Sexual Activity    Alcohol use: No    Drug use: Never    Sexual activity: Yes  "      Prior to Admission medications    Medication Sig Start Date End Date Taking? Authorizing Provider   fluticasone (FLONASE) 50 MCG/ACT nasal spray 2 sprays into the nostril(s) as directed by provider Daily.    Emergency, Nurse Mohinder, RN   ibuprofen (ADVIL,MOTRIN) 800 MG tablet TAKE 1 TABLET BY MOUTH EVERY 8 HOURS WITH FOOD AS NEEDED 5/13/24   Provider, MD Thomas   Prenatal Vit-Fe Fumarate-FA (Prenatal 19) chewable tablet Chew 1 tablet Daily. 8/27/24 8/27/25  Jeanne Gaitan APRN       Medications   sodium chloride 0.9 % flush 10 mL (has no administration in time range)   sodium chloride 0.9 % bolus 1,000 mL (0 mL Intravenous Stopped 10/20/24 1608)   potassium chloride (KLOR-CON M20) CR tablet 20 mEq (20 mEq Oral Given 10/20/24 1510)   cefTRIAXone (ROCEPHIN) 1,000 mg in sodium chloride 0.9 % 100 mL MBP (1,000 mg Intravenous New Bag 10/20/24 1652)       /72   Pulse 90   Temp 98.1 °F (36.7 °C) (Oral)   Resp 16   Ht 167.6 cm (66\")   Wt 66.5 kg (146 lb 8 oz)   SpO2 100%   BMI 23.65 kg/m²       Objective   Physical Exam  Vitals and nursing note reviewed. Exam conducted with a chaperone present.   Constitutional:       General: She is not in acute distress.     Appearance: Normal appearance. She is well-developed. She is not diaphoretic.   HENT:      Head: Normocephalic and atraumatic.   Eyes:      Conjunctiva/sclera: Conjunctivae normal.      Pupils: Pupils are equal, round, and reactive to light.   Neck:      Trachea: No tracheal deviation.   Cardiovascular:      Rate and Rhythm: Normal rate and regular rhythm.      Heart sounds: Normal heart sounds. No murmur heard.  Pulmonary:      Effort: Pulmonary effort is normal.      Breath sounds: Normal breath sounds.   Abdominal:      General: Bowel sounds are normal. There is no distension.      Palpations: Abdomen is soft. There is no mass.      Tenderness: There is no abdominal tenderness. There is no guarding or rebound.      Comments: Gravid  uterus " to umbilicus   Genitourinary:     Vagina: Vaginal discharge present. No tenderness.      Cervix: Discharge and friability present. No lesion.   Musculoskeletal:         General: Normal range of motion.      Cervical back: Normal range of motion and neck supple.   Skin:     General: Skin is warm and dry.   Neurological:      Mental Status: She is alert and oriented to person, place, and time.   Psychiatric:         Behavior: Behavior normal.         Thought Content: Thought content normal.         Judgment: Judgment normal.         Procedures         Lab Results (last 24 hours)       Procedure Component Value Units Date/Time    Urinalysis With Culture If Indicated - Urine, Clean Catch [[id number]]  (Abnormal) Collected: 10/20/24 1408    Specimen: Urine, Clean Catch Updated: 10/20/24 1445     Color, UA Yellow     Appearance, UA Clear     pH, UA 6.5     Specific Gravity, UA 1.013     Glucose, UA Negative     Ketones, UA Negative     Bilirubin, UA Negative     Blood, UA Negative     Protein, UA Negative     Leuk Esterase, UA Moderate (2+)     Nitrite, UA Negative     Urobilinogen, UA 0.2 E.U./dL    Narrative:      In absence of clinical symptoms, the presence of pyuria, bacteria, and/or nitrites on the urinalysis result does not correlate with infection.    Urine Culture - Urine, Urine, Clean Catch [[id number]] Collected: 10/20/24 1408    Specimen: Urine, Clean Catch Updated: 10/20/24 1443    Urinalysis, Microscopic Only - Urine, Clean Catch [[id number]]  (Abnormal) Collected: 10/20/24 1408    Specimen: Urine, Clean Catch Updated: 10/20/24 1445     RBC, UA 0-2 /HPF      WBC, UA 6-10 /HPF      Bacteria, UA 1+ /HPF      Squamous Epithelial Cells, UA 0-2 /HPF      Hyaline Casts, UA 0-2 /LPF      Methodology Automated Microscopy    CBC & Differential [[id number]]  (Abnormal) Collected: 10/20/24 1429    Specimen: Blood Updated: 10/20/24 1512    Narrative:      The following orders were created for panel order CBC &  Differential.  Procedure                               Abnormality         Status                     ---------                               -----------         ------                     CBC Auto Differential[[id number]]        Abnormal            Final result                 Please view results for these tests on the individual orders.    Comprehensive Metabolic Panel [[id number]]  (Abnormal) Collected: 10/20/24 1429    Specimen: Blood Updated: 10/20/24 1501     Glucose 68 mg/dL      BUN 7 mg/dL      Creatinine 0.43 mg/dL      Sodium 135 mmol/L      Potassium 3.4 mmol/L      Chloride 100 mmol/L      CO2 23.0 mmol/L      Calcium 9.5 mg/dL      Total Protein 8.3 g/dL      Albumin 4.2 g/dL      ALT (SGPT) 9 U/L      AST (SGOT) 16 U/L      Alkaline Phosphatase 50 U/L      Total Bilirubin 0.5 mg/dL      Globulin 4.1 gm/dL      A/G Ratio 1.0 g/dL      BUN/Creatinine Ratio 16.3     Anion Gap 12.0 mmol/L      eGFR 133.5 mL/min/1.73     Narrative:      GFR Normal >60  Chronic Kidney Disease <60  Kidney Failure <15      hCG, Quantitative, Pregnancy [[id number]] Collected: 10/20/24 1429    Specimen: Blood Updated: 10/20/24 1529     HCG Quantitative 139,819.00 mIU/mL     Narrative:      HCG Ranges by Gestational Age    Females - non-pregnant premenopausal   </= 1mIU/mL HCG  Females - postmenopausal               </= 7mIU/mL HCG    3 Weeks         5.8 -    71.2 mIU/mL  4 Weeks         9.5 -     750 mIU/mL  5 Weeks         217 -   7,138 mIU/mL  6 Weeks         158 -  31,795 mIU/mL  7 Weeks       3,697 - 163,563 mIU/mL  8 Weeks      32,065 - 149,571 mIU/mL  9 Weeks      63,803 - 151,410 mIU/mL  10 Weeks     46,509 - 186,977 mIU/mL  12 Weeks     27,832 - 210,612 mIU/mL  14 Weeks     13,950 -  62,530 mIU/mL  15 Weeks     12,039 -  70,971 mIU/mL  16 Weeks      9,040 -  56,451 mIU/mL  17 Weeks      8,175 -  55,868 mIU/mL  18 Weeks      8,099 -  58,176 mIU/mL    CBC Auto Differential [[id number]]  (Abnormal) Collected: 10/20/24 5516     Specimen: Blood Updated: 10/20/24 1512     WBC 13.46 10*3/mm3      RBC 4.17 10*6/mm3      Hemoglobin 12.0 g/dL      Hematocrit 35.3 %      MCV 84.7 fL      MCH 28.8 pg      MCHC 34.0 g/dL      RDW 12.8 %      RDW-SD 39.8 fl      MPV 10.7 fL      Platelets 269 10*3/mm3      nRBC 0.0 /100 WBC     Manual Differential [[id number]]  (Abnormal) Collected: 10/20/24 1429    Specimen: Blood Updated: 10/20/24 1513     Neutrophil % 78.8 %      Lymphocyte % 10.6 %      Monocyte % 1.0 %      Eosinophil % 3.8 %      Basophil % 0.0 %      Bands %  5.8 %      Neutrophils Absolute 11.39 10*3/mm3      Lymphocytes Absolute 1.43 10*3/mm3      Monocytes Absolute 0.13 10*3/mm3      Eosinophils Absolute 0.51 10*3/mm3      Basophils Absolute 0.00 10*3/mm3      Anisocytosis Slight/1+     Macrocytes Slight/1+     WBC Morphology Normal     Platelet Morphology Normal            US Ob < 14 Weeks Single or First Gestation    Result Date: 10/20/2024  Narrative: US OB < 14 WEEKS SINGLE OR FIRST GESTATION- 10/20/2024 3:15 PM  REASON FOR EXAM: pregnant vb   COMPARISON: None  TECHNIQUE: Multiple longitudinal and transverse real-time sonographic images of the pelvis are obtained using a transabdominal transducer. Ultrasound images and report are stored in the PACS per institutional protocol.  FINDINGS:  Single intrauterine pregnancy is noted. The crown rump length measures 5.9 cm. A fetal heart rate is identified and measured to be 157 bpm. No definite perigestational hemorrhage. Right ovary measures 4.9 x 2.3 x 2.9 cm. There is a 3.4 cm benign-appearing anechoic right ovarian cyst which is likely physiologic. Left ovary measures 4 x 2.5 x 1.9 cm. Left lateral wall intramural uterine fibroid measuring 4.7 cm. Additional 3.1 cm intramural fibroid left of midline at the fundus. No free fluid in the pelvis.      Impression: 1. Single, intrauterine pregnancy with an estimated gestational age of 12 weeks 3 days. 2. Fetal heart rate 157 bpm. 3. No obvious  perigestational hemorrhage. 4. Uterine fibroids.   This report was signed and finalized on 10/20/2024 5:42 PM by Dr Calvin Singleton.      Maternal Fetal Study OB < 14 Weeks    Result Date: 2024  Narrative: REVIEWING YOUR TEST RESULTS IN [facility] IS NOT A SUBSTITUTE FOR DISCUSSING THOSE RESULTS WITH YOUR HEALTH CARE PROVIDER. PLEASE CONTACT YOUR PROVIDER VIA [facility] TO DISCUSS ANY QUESTIONS OR CONCERNS YOU MAY HAVE REGARDING THESE TEST RESULTS.  OBSTETRICS REPORT                   RADIOLOGY REPORT    FACILITY:  [facility] MATERNAL FETAL MEDICINE [facility] ROOM NUMBER:   PATIENT NAME/:  LEE IRIZARRY    1990 MRN NUMBER:  [id number] ACCOUNT NUMBER:  [id number] ACCESSION NUMBER:  [id number]    DATE OF EXAM:  2024 EXAMINATION(S):  MATERNAL FETAL STUDY          ----------------------------------------------------------------------  OBSTETRICS REPORT                   (Signed Final 2024 05:15 pm) ---------------------------------------------------------------------- PATIENT INFO:     ID #:       [id number]                    :  [birth year] (33 yrs)(F)  Name:       Lee Irizarry            Visit Date: 2024 02:51 pm ---------------------------------------------------------------------- PERFORMED BY:     Attending:        Rios Javier MD  Performed By:     Josefa Bower RDMS  Referred By:      Kemar Morales M.D.  Ref. Address:     [address] [place] [address]  Location:         [facility] ---------------------------------------------------------------------- ORDERS:     #  Description                       Code         Ordered By  1  MATERNAL FETAL STUDY              [facility]#AUDREY DENNIS                                       [id number]       JAVIER ----------------------------------------------------------------------     #  Order #                  Accession #              Episode #  1  [id number]                [id number]          [id number]  ---------------------------------------------------------------------- SERVICE(S) PROVIDED:     MATERNAL FETAL STUDY                                  [facility]#[id number]                                                        7  US: Complete Scan < 14 wks                            [id number] ---------------------------------------------------------------------- INDICATIONS:     8 weeks gestation of pregnancy                  Z3A.08  Viability                                       655.93  Drug abuse                                      648.33 ---------------------------------------------------------------------- FETAL EVALUATION:     Num Of Fetuses:          1  Preg. Location:          Intrauterine  Gest. Sac:               Visualized  Yolk Sac:                Visualized  Fetal Heart Rate(bpm):   182  Cardiac Activity:        Observed     Amniotic Fluid  ISREAL FV:      Within normal limits ---------------------------------------------------------------------- BIOMETRY:     CRL:      19.7  mm     G.Age:   8w 4d                   DINO:   5/3/25    ---------------------------------------------------------------------- OB HISTORY:  :    1         Term:   0 ---------------------------------------------------------------------- GESTATIONAL AGE:     LMP:           8w 1d       Date:  24            DINO:    25  Best:          8w 1d    Det. By:  LMP  (24)    DINO:    25 ---------------------------------------------------------------------- CERVIX UTERUS ADNEXA:     Uterus  Fibroids noted, largest measuring 3.35x3.2x3.13cm     Right Ovary  Cyst measuring 6.05x3.43x4.98cm ---------------------------------------------------------------------- COMMENTS:     NIETO INTRAUTERINE GESTATION  CERVIX: APPEARS LONG, CLOSED, NO EVIDENCE  OF FUNNELING  UTERUS AND ADNEXA: FIBROID NOTED, RT  OVARIAN CYST ---------------------------------------------------------------------- IMPRESSION:     CRL size remains consistent with dates.   Morphology appears as expected. Cardiac activity  present.  Fibroids seen today, left KEITH measuring  3.35x3.2x3.13cm  Simple right ovarian cyst again seen, measuring  6.05x3.43x4.98cm.  No fluid in the cul de sac.     Findings reviewed with Lee.  Recommend FU US for anatomic assessment at 19-20  weeks. ----------------------------------------------------------------------                  Rios Ivy MD Electronically Signed Final Report   9/25/2024 05:15 pm ----------------------------------------------------------------------    <Electronically signed by Rios Ivy> 09/25/2024 1451    US OB LESS THAN 14 WEEKS SINGLE OR FIRST GESTATION    Result Date: 9/25/2024  Narrative: This result has an attachment that is not available.     ED Course  ED Course as of 10/20/24 1749   Sun Oct 20, 2024   1747 Patient's laboratory data reveals that she is a positive.  hCG quant is 139,819.  Urinalysis suggest possible UTI given that she has 1+ bacteria.  Previous culture was evaluated and there was some contamination and normal skin roberto but only treat if is pregnant which she is.  So going to give her some antibiotics with culture pending.  Ultrasound did show evidence of a single intrauterine pregnancy with an estimated gestational age of 12 weeks and 3 days.  Fetal heart rate 157 bpm.  No obvious perigestational hemorrhage.  Uterine fibroids.    No evidence of vaginal bleeding on examination.  Recommend close follow-up with OB/GYN.  Potassium was repleted while in the ER.  Strict return precaution advised.  Patient will be discharged in stable condition. [TK]      ED Course User Index  [TK] Rolando Ty PA          [facility]      Final diagnoses:   Vaginal bleeding   12 weeks gestation of pregnancy   Acute UTI       Disposition: Patient will be discharged in stable condition.     Rolando Ty PA  10/20/24 174

## 2024-10-21 LAB — BACTERIA SPEC AEROBE CULT: NORMAL

## 2024-11-11 ENCOUNTER — INITIAL PRENATAL (OUTPATIENT)
Age: 34
End: 2024-11-11
Payer: COMMERCIAL

## 2024-11-11 VITALS — BODY MASS INDEX: 24.05 KG/M2 | SYSTOLIC BLOOD PRESSURE: 104 MMHG | WEIGHT: 149 LBS | DIASTOLIC BLOOD PRESSURE: 72 MMHG

## 2024-11-11 DIAGNOSIS — Z34.02 ENCOUNTER FOR SUPERVISION OF NORMAL FIRST PREGNANCY IN SECOND TRIMESTER: Primary | ICD-10-CM

## 2024-11-11 PROCEDURE — 0502F SUBSEQUENT PRENATAL CARE: CPT | Performed by: OBSTETRICS & GYNECOLOGY

## 2024-11-11 PROCEDURE — 90656 IIV3 VACC NO PRSV 0.5 ML IM: CPT | Performed by: OBSTETRICS & GYNECOLOGY

## 2024-11-11 PROCEDURE — 90471 IMMUNIZATION ADMIN: CPT | Performed by: OBSTETRICS & GYNECOLOGY

## 2024-11-11 RX ORDER — CETIRIZINE HYDROCHLORIDE 10 MG/1
1 TABLET ORAL DAILY
COMMUNITY

## 2024-11-11 RX ORDER — MONTELUKAST SODIUM 10 MG/1
1 TABLET ORAL DAILY
COMMUNITY

## 2024-11-11 NOTE — PROGRESS NOTES
Transfer of care from [facility]  First trimester complicated by nausea and vaginal bleeding  Feeling well, no nausea  Reviewed normal prenatal labs  Discussed normal ffDNA and maternal carrier screening results  Flu vaccine today    Diagnoses and all orders for this visit:    1. Encounter for supervision of normal first pregnancy in second trimester (Primary)  -     Fluzone >6mos ([id number])

## 2024-12-03 ENCOUNTER — TELEPHONE (OUTPATIENT)
Dept: OBSTETRICS AND GYNECOLOGY | Age: 34
End: 2024-12-03
Payer: COMMERCIAL

## 2024-12-03 NOTE — TELEPHONE ENCOUNTER
Patient called with c/o of left abdominal tightness. Informed patient that it could be round ligament pain. Encouraged patient to increase water, rest, and elevate feet. Patient VU

## 2024-12-18 ENCOUNTER — ROUTINE PRENATAL (OUTPATIENT)
Age: 34
End: 2024-12-18
Payer: COMMERCIAL

## 2024-12-18 VITALS — BODY MASS INDEX: 26.63 KG/M2 | WEIGHT: 165 LBS | SYSTOLIC BLOOD PRESSURE: 118 MMHG | DIASTOLIC BLOOD PRESSURE: 66 MMHG

## 2024-12-18 DIAGNOSIS — Z34.02 PRIMIGRAVIDA, SECOND TRIMESTER: ICD-10-CM

## 2024-12-18 DIAGNOSIS — O34.10 UTERINE FIBROID DURING PREGNANCY, ANTEPARTUM: ICD-10-CM

## 2024-12-18 DIAGNOSIS — O44.40 LOW-LYING PLACENTA: ICD-10-CM

## 2024-12-18 DIAGNOSIS — Z3A.20 20 WEEKS GESTATION OF PREGNANCY: Primary | ICD-10-CM

## 2024-12-18 DIAGNOSIS — D25.9 UTERINE FIBROID DURING PREGNANCY, ANTEPARTUM: ICD-10-CM

## 2024-12-18 PROBLEM — O34.80 OVARIAN CYST COMPLICATING PREGNANCY, ANTEPARTUM: Status: ACTIVE | Noted: 2024-09-25

## 2024-12-18 PROBLEM — N83.209 OVARIAN CYST COMPLICATING PREGNANCY, ANTEPARTUM: Status: ACTIVE | Noted: 2024-09-25

## 2024-12-18 LAB
GLUCOSE UR STRIP-MCNC: NEGATIVE MG/DL
PROT UR STRIP-MCNC: NEGATIVE MG/DL

## 2024-12-18 NOTE — PROGRESS NOTES
"Reason for visit: Routine OB visit at 20w2d     CC:  Denies concerns at this time. Feeling \"okay.\" Endorses feeling fetal movement.     ROS: All systems reviewed and are negative with exception of the following: amenorrhea, fatigue    Weight 74.8 kg (165 lb); /66; ; FH 20 cm   Total weight gain: 11.3 kg (25 lb) with Total weight gain expected 11.5 kg (25 lb)-16 kg (35 lb)    Urine today and reviewed: negative glucose, negative protein    Ultrasound  20-week anatomy scan today: anterior placenta, EFW 44%; Larger 3.9 x 5.1 x 3.3 cm posterior to cervix lower uterine segment fibroid and then a maternal left of the cervix fibroid noted    13-week ultrasound: low-lying placenta overlying the internal cervical os    Exam:  General Appearance:  No visualized signs of distress. Normal mood and behavior  Cardiorespiratory: HR str and reg. Lungs clear. Resp even and unlabored.  Abdomen: is soft and nontender. No CVA tenderness. Gravid uterus.  Ext: no edema. Calves non-tender.     Impression  Diagnoses and all orders for this visit:    1. 20 weeks gestation of pregnancy (Primary)  -     POC Urinalysis Dipstick    2. Primigravida, second trimester  Discussed second trimester of pregnancy, discomforts and measures of support, fetal movement, bleeding warnings, pelvic pain and cramping/contraction warnings, and signs and symptoms to report. Reviewed anatomy scan reports Discussed and encouraged to call or come to the hospital with vaginal bleeding, leaking of fluid, contractions, or any other concerns. Second Trimester of Pregnancy video and Round Ligament Pain education included in the AVS.     3. Uterine fibroid during pregnancy, antepartum  Two fibroids and will plan for interval growth and assessment of fibroids at 32 weeks gestation.     4. Low-lying placenta  Noted at 13-week ultrasound but not noted on 20-week anatomy scan report.             Refer to the AVS instructions for additional education provided with " today's visit.         Return to the office in 4 weeks for routine prenatal visit with Dr. Davis and as needed with concerns.        This note has been signed electronically.    Mechelle Bedolla DNP, APRN, CNM, RNC-OB

## 2025-01-13 ENCOUNTER — ROUTINE PRENATAL (OUTPATIENT)
Age: 35
End: 2025-01-13
Payer: COMMERCIAL

## 2025-01-13 VITALS — SYSTOLIC BLOOD PRESSURE: 122 MMHG | DIASTOLIC BLOOD PRESSURE: 82 MMHG | WEIGHT: 175 LBS | BODY MASS INDEX: 28.25 KG/M2

## 2025-01-13 DIAGNOSIS — Z34.02 ENCOUNTER FOR SUPERVISION OF NORMAL FIRST PREGNANCY IN SECOND TRIMESTER: Primary | ICD-10-CM

## 2025-01-13 DIAGNOSIS — O34.10 UTERINE FIBROID DURING PREGNANCY, ANTEPARTUM: ICD-10-CM

## 2025-01-13 DIAGNOSIS — D25.9 UTERINE FIBROID DURING PREGNANCY, ANTEPARTUM: ICD-10-CM

## 2025-01-13 NOTE — PROGRESS NOTES
Good fetal movement  Reviewed normal anatomy US, KEITH fibroid noted  Glucola and Hgb ordered for next visit  Discussed uterine fibroid noted on anatomy US, will follow up at 32 wweks    Diagnoses and all orders for this visit:    1. Encounter for supervision of normal first pregnancy in second trimester (Primary)    2. Uterine fibroid during pregnancy, antepartum

## 2025-02-13 ENCOUNTER — ROUTINE PRENATAL (OUTPATIENT)
Age: 35
End: 2025-02-13
Payer: COMMERCIAL

## 2025-02-13 VITALS — BODY MASS INDEX: 28.73 KG/M2 | WEIGHT: 178 LBS | SYSTOLIC BLOOD PRESSURE: 124 MMHG | DIASTOLIC BLOOD PRESSURE: 88 MMHG

## 2025-02-13 DIAGNOSIS — D25.9 UTERINE FIBROID DURING PREGNANCY, ANTEPARTUM: ICD-10-CM

## 2025-02-13 DIAGNOSIS — Z34.03 ENCOUNTER FOR SUPERVISION OF NORMAL FIRST PREGNANCY IN THIRD TRIMESTER: Primary | ICD-10-CM

## 2025-02-13 DIAGNOSIS — O34.10 UTERINE FIBROID DURING PREGNANCY, ANTEPARTUM: ICD-10-CM

## 2025-02-13 NOTE — PROGRESS NOTES
Good fetal movement  Glucola and Hgb today, Rh positive  Discuss Tdap next visit  US to follow up fibroid in 4 weeks at [facility]  Discussed [place] contractions    Diagnoses and all orders for this visit:    1. Encounter for supervision of normal first pregnancy in third trimester (Primary)  -     Gestational Screen 1 Hr ([facility])  -     Hemoglobin  -     RPR Qualitative with Reflex to Quant    2. Uterine fibroid during pregnancy, antepartum

## 2025-02-14 LAB
GLUCOSE 1H P 50 G GLC PO SERPL-MCNC: 88 MG/DL (ref 70–139)
HGB BLD-MCNC: 10.7 G/DL (ref 11.1–15.9)
RPR SER QL: NON REACTIVE

## 2025-02-27 ENCOUNTER — ROUTINE PRENATAL (OUTPATIENT)
Age: 35
End: 2025-02-27
Payer: COMMERCIAL

## 2025-02-27 VITALS — WEIGHT: 183.7 LBS | BODY MASS INDEX: 29.65 KG/M2 | DIASTOLIC BLOOD PRESSURE: 78 MMHG | SYSTOLIC BLOOD PRESSURE: 122 MMHG

## 2025-02-27 DIAGNOSIS — Z3A.30 30 WEEKS GESTATION OF PREGNANCY: Primary | ICD-10-CM

## 2025-02-27 DIAGNOSIS — O34.10 UTERINE FIBROID DURING PREGNANCY, ANTEPARTUM: ICD-10-CM

## 2025-02-27 DIAGNOSIS — D25.9 UTERINE FIBROID DURING PREGNANCY, ANTEPARTUM: ICD-10-CM

## 2025-02-27 NOTE — PROGRESS NOTES
Good fetal movement  No contractions, mild reflux  Reviewed normal anatomy ultrasound  Reviewed normal Glucola  Iron supplement for mild anemia  Discussed Tdap today  Has MFM appointment in 2 weeks   labor precautions    Diagnoses and all orders for this visit:    1. 30 weeks gestation of pregnancy (Primary)    2. Uterine fibroid during pregnancy, antepartum

## 2025-03-13 ENCOUNTER — ROUTINE PRENATAL (OUTPATIENT)
Age: 35
End: 2025-03-13
Payer: COMMERCIAL

## 2025-03-13 VITALS — BODY MASS INDEX: 30.54 KG/M2 | SYSTOLIC BLOOD PRESSURE: 122 MMHG | WEIGHT: 189.2 LBS | DIASTOLIC BLOOD PRESSURE: 80 MMHG

## 2025-03-13 DIAGNOSIS — D25.9 UTERINE FIBROID DURING PREGNANCY, ANTEPARTUM: ICD-10-CM

## 2025-03-13 DIAGNOSIS — Z34.03 ENCOUNTER FOR SUPERVISION OF NORMAL FIRST PREGNANCY IN THIRD TRIMESTER: ICD-10-CM

## 2025-03-13 DIAGNOSIS — O34.10 UTERINE FIBROID DURING PREGNANCY, ANTEPARTUM: ICD-10-CM

## 2025-03-13 DIAGNOSIS — Z3A.32 32 WEEKS GESTATION OF PREGNANCY: Primary | ICD-10-CM

## 2025-03-13 NOTE — PROGRESS NOTES
Good fetal movement  No contractions  Has MFM appointment later today to check fibroid and fetal growth  Tdap next visit   labor precautions    Diagnoses and all orders for this visit:    1. 32 weeks gestation of pregnancy (Primary)    2. Uterine fibroid during pregnancy, antepartum    3. Encounter for supervision of normal first pregnancy in third trimester

## 2025-03-19 ENCOUNTER — TELEPHONE (OUTPATIENT)
Dept: OBSTETRICS AND GYNECOLOGY | Age: 35
End: 2025-03-19
Payer: COMMERCIAL

## 2025-03-19 NOTE — TELEPHONE ENCOUNTER
Pt calling to report rash under right breast. Pt is 33.2 weeks pregnant today and last OV 3/13/25. Pt reports rash is itching and has raised bumps. Pt recommended to try steroid cream but with worsening symptoms to follow up with urgent care or PCP.

## 2025-03-27 ENCOUNTER — ROUTINE PRENATAL (OUTPATIENT)
Age: 35
End: 2025-03-27
Payer: COMMERCIAL

## 2025-03-27 VITALS — DIASTOLIC BLOOD PRESSURE: 84 MMHG | WEIGHT: 191 LBS | BODY MASS INDEX: 30.83 KG/M2 | SYSTOLIC BLOOD PRESSURE: 122 MMHG

## 2025-03-27 DIAGNOSIS — D25.9 UTERINE FIBROID DURING PREGNANCY, ANTEPARTUM: ICD-10-CM

## 2025-03-27 DIAGNOSIS — Z3A.34 34 WEEKS GESTATION OF PREGNANCY: ICD-10-CM

## 2025-03-27 DIAGNOSIS — O34.10 UTERINE FIBROID DURING PREGNANCY, ANTEPARTUM: ICD-10-CM

## 2025-03-27 DIAGNOSIS — Z34.03 ENCOUNTER FOR SUPERVISION OF NORMAL FIRST PREGNANCY IN THIRD TRIMESTER: Primary | ICD-10-CM

## 2025-03-27 RX ORDER — FERROUS GLUCONATE 324(38)MG
324 TABLET ORAL DAILY
COMMUNITY

## 2025-03-27 NOTE — PROGRESS NOTES
Good fetal movement  Labor precautions  Reviewed normal MFM growth ultrasound 2 weeks ago  Reviewed normal 1 hour glucose screening  GBS and cx's next visit    Diagnoses and all orders for this visit:    1. Encounter for supervision of normal first pregnancy in third trimester (Primary)    2. 34 weeks gestation of pregnancy  -     Tdap Vaccine Greater Than or Equal To 8yo IM    3. Uterine fibroid during pregnancy, antepartum

## 2025-04-10 ENCOUNTER — ROUTINE PRENATAL (OUTPATIENT)
Age: 35
End: 2025-04-10
Payer: COMMERCIAL

## 2025-04-10 VITALS — SYSTOLIC BLOOD PRESSURE: 120 MMHG | DIASTOLIC BLOOD PRESSURE: 82 MMHG | WEIGHT: 192.8 LBS | BODY MASS INDEX: 31.12 KG/M2

## 2025-04-10 DIAGNOSIS — Z3A.36 36 WEEKS GESTATION OF PREGNANCY: Primary | ICD-10-CM

## 2025-04-10 LAB
GLUCOSE UR STRIP-MCNC: NEGATIVE MG/DL
PROT UR STRIP-MCNC: ABNORMAL MG/DL

## 2025-04-10 NOTE — PROGRESS NOTES
Reason for visit: Routine OB visit at 36w3d      History of Present Illness  The patient is a 34-year-old female who presents for a routine prenatal visit at 36 weeks gestation.    She reports feeling well overall, with the exception of a mild headache that began upon awakening this morning. The headache is localized to the temple region, and she suspects it may be stress-related due to recent disagreements with her partner. She has been engaging in deep breathing exercises as a relaxation technique in preparation for labor. She has expressed interest in purchasing an exercise ball but is uncertain about its appropriate use during pregnancy. She has identified a potential pediatrician for her child and plans to breastfeed. She attended a lactation class in March 2025 and has been researching breast pumps. She also reports experiencing pressure on her left side during sleep, which persists into the morning. She recalls an episode of sexual activity with her partner in March 2025, following which she experienced symptoms suggestive of a yeast infection.     She is uncertain if the headache is allergy-related. She takes Flonase nasal spray, Zyrtec, and Singulair for her allergies.       ROS: All systems reviewed and are negative with exception(s) noted above      /82   Wt 87.5 kg (192 lb 12.8 oz)   BMI 31.12 kg/m²   Total weight gain: 23.9 kg (52 lb 12.8 oz)  Total weight gain expected 11.5 kg (25 lb)-16 kg (35 lb)    Prenatal Assessment  Fetal Heart Rate: 131  Fundal Height (cm): 36 cm  Movement: Present  Presentation: Vertex    Urine today and reviewed: negative glucose, trace protein    Ultrasound  32-week: anterior placenta, EFW 35%, normal interval growth, lower uterine segment fibroid measures 4.8 x 5.5 x 4.1 cm and sits left of the cervix - appearing non-obstructive      Exam:  General Appearance:  No visualized signs of distress. Normal mood and behavior.  Cardiorespiratory: HR str and reg. Lungs clear.  Breathing even and unlabored.  Abdomen: soft and nontender. No CVA tenderness. Gravid uterus.  Extremeties: no edema. Calves non-tender.         Impression  Diagnoses and all orders for this visit:    1. 36 weeks gestation of pregnancy (Primary)  -     POC Urinalysis Dipstick  -     Cancel: Strep B Screen - Swab, Vaginal/Rectum  -     Cancel: Chlamydia trachomatis, Neisseria gonorrhoeae, Trichomonas vaginalis, PCR - Swab, Vagina  -     Strep Grp B MARTHA + Reflex - Swab, Vaginal/Rectum  -     NuSwab VG+ - Swab, Vagina    Other orders  -     Strep Gp B Susceptibility - ,          Assessment & Plan  1. Prenatal visit at 36 weeks gestation.  Her blood pressure is within the normal range today at 120/82. The fetal heart rate is also within the normal range at 131. Urinalysis revealed no glucose but a trace amount of protein. She reported a slight headache, which could be attributed to dehydration. She was advised to utilize focal points during labor, such as deep breathing exercises, music, or aromatherapy, to maintain a calm environment. The use of an exercise ball was recommended to facilitate pelvic opening and support labor progression. She was informed about the availability of nitrous oxide gas, IV medication, and epidural during labor, and that she can transition between these options as needed. A prescription for a breast pump will be sent to [facility], which will liaise with her insurance provider and deliver the pump to the hospital post-delivery. Cultures for gonorrhea, chlamydia, GBS bacteria, bacterial vaginosis, and yeast will be obtained today. She was instructed to commence walking exercises and use the exercise ball at home. If the headache persists despite Tylenol use, or if she experiences visual disturbances or pain under the right rib not associated with fetal movement, she should inform us immediately. In the event of vaginal bleeding, rupture of membranes, or regular painful contractions, she  should proceed to the labor and delivery unit on the second floor for evaluation.    2. Allergy management.  She uses Flonase nasal spray, Zyrtec, and Singulair for allergy management. She was advised to continue these medications as needed.    Return to the clinic in 1 week for prenatal visit with Dr. Davis.     Refer to the AVS instructions for additional education provided.       This note has been signed electronically.    Mechelle Bedolla DNP, JESSICA, CNM    Patient or patient representative verbalized consent for the use of Ambient Listening during the visit with  JESSICA Varghese for chart documentation. 4/10/2025  11:45 CDT

## 2025-04-12 LAB
A VAGINAE DNA VAG QL NAA+PROBE: ABNORMAL SCORE
BVAB2 DNA VAG QL NAA+PROBE: ABNORMAL SCORE
C ALBICANS DNA VAG QL NAA+PROBE: POSITIVE
C GLABRATA DNA VAG QL NAA+PROBE: NEGATIVE
C TRACH DNA SPEC QL NAA+PROBE: NEGATIVE
MEGA1 DNA VAG QL NAA+PROBE: ABNORMAL SCORE
N GONORRHOEA DNA VAG QL NAA+PROBE: NEGATIVE
T VAGINALIS DNA VAG QL NAA+PROBE: NEGATIVE

## 2025-04-15 LAB
CLINDAMYCIN ISLT KB: ABNORMAL
GP B STREP DNA SPEC QL NAA+PROBE: POSITIVE
ORGANISM ID: ABNORMAL

## 2025-04-17 ENCOUNTER — ROUTINE PRENATAL (OUTPATIENT)
Age: 35
End: 2025-04-17
Payer: COMMERCIAL

## 2025-04-17 VITALS — WEIGHT: 198 LBS | DIASTOLIC BLOOD PRESSURE: 82 MMHG | BODY MASS INDEX: 31.96 KG/M2 | SYSTOLIC BLOOD PRESSURE: 124 MMHG

## 2025-04-17 DIAGNOSIS — O34.10 UTERINE FIBROID DURING PREGNANCY, ANTEPARTUM: ICD-10-CM

## 2025-04-17 DIAGNOSIS — Z3A.37 37 WEEKS GESTATION OF PREGNANCY: ICD-10-CM

## 2025-04-17 DIAGNOSIS — D25.9 UTERINE FIBROID DURING PREGNANCY, ANTEPARTUM: ICD-10-CM

## 2025-04-17 DIAGNOSIS — Z34.03 ENCOUNTER FOR SUPERVISION OF NORMAL FIRST PREGNANCY IN THIRD TRIMESTER: Primary | ICD-10-CM

## 2025-04-17 PROBLEM — O23.599: Status: ACTIVE | Noted: 2025-04-17

## 2025-04-17 PROBLEM — B37.31: Status: ACTIVE | Noted: 2025-04-17

## 2025-04-17 PROBLEM — O99.820 GBS (GROUP B STREPTOCOCCUS CARRIER), +RV CULTURE, CURRENTLY PREGNANT: Status: ACTIVE | Noted: 2025-04-17

## 2025-04-17 NOTE — PROGRESS NOTES
Good fetal movement  No contractions  Reviewed normal 1 hour glucose screen  Cervix fingertip/50/-3, soft, mid position  Reviewed GBS positive  Labor instructions    Diagnoses and all orders for this visit:    1. Encounter for supervision of normal first pregnancy in third trimester (Primary)    2. Uterine fibroid during pregnancy, antepartum    3. 37 weeks gestation of pregnancy

## 2025-04-18 ENCOUNTER — TELEPHONE (OUTPATIENT)
Dept: OBSTETRICS AND GYNECOLOGY | Age: 35
End: 2025-04-18
Payer: COMMERCIAL

## 2025-04-18 DIAGNOSIS — B37.31: Primary | ICD-10-CM

## 2025-04-18 DIAGNOSIS — O23.599: Primary | ICD-10-CM

## 2025-04-18 RX ORDER — TERCONAZOLE 4 MG/G
1 CREAM VAGINAL NIGHTLY
Qty: 1 EACH | Refills: 0 | Status: SHIPPED | OUTPATIENT
Start: 2025-04-18 | End: 2025-04-25

## 2025-04-18 NOTE — TELEPHONE ENCOUNTER
Pt was given Miconazole 2% but has called stating pharmacy still does not have in stock. Do you want to send something else in?

## 2025-04-18 NOTE — TELEPHONE ENCOUNTER
DONNA CONTE    [phone]    PT IS 38wks    PT HAD PRESCRIPTION CALLED INTO [facility].    THEY WERE OUT OF STOCK PER PT WAS TOLD TO WAIT UNTIL FRIDAY.    THEY STILL DO NOT HAVE IT, CAN SOMETHING ELSE GET CALLED IN    PT FEELS SHE SHOULDVE BEEN CALLED W/ RESULTS INSTEAD OF WAITING FOR HER TO COME INTO OFFICE

## 2025-04-18 NOTE — TELEPHONE ENCOUNTER
Attempted to reach pt by phone but no answer. LM letting patient know to check her pharmacy for new medication.

## 2025-04-24 ENCOUNTER — ROUTINE PRENATAL (OUTPATIENT)
Age: 35
End: 2025-04-24
Payer: COMMERCIAL

## 2025-04-24 VITALS — WEIGHT: 200.6 LBS | SYSTOLIC BLOOD PRESSURE: 110 MMHG | DIASTOLIC BLOOD PRESSURE: 58 MMHG | BODY MASS INDEX: 32.38 KG/M2

## 2025-04-24 DIAGNOSIS — Z3A.38 38 WEEKS GESTATION OF PREGNANCY: Primary | ICD-10-CM

## 2025-04-24 DIAGNOSIS — Z34.03 ENCOUNTER FOR SUPERVISION OF NORMAL FIRST PREGNANCY IN THIRD TRIMESTER: ICD-10-CM

## 2025-04-24 NOTE — PROGRESS NOTES
Good fetal movement  Has had a few contractions  Using Terazole cream, day 4  Cervix unchanged  Reviewed GBS positive  Labor instructions    Diagnoses and all orders for this visit:    1. 38 weeks gestation of pregnancy (Primary)    2. Encounter for supervision of normal first pregnancy in third trimester

## 2025-05-01 ENCOUNTER — ROUTINE PRENATAL (OUTPATIENT)
Age: 35
End: 2025-05-01
Payer: COMMERCIAL

## 2025-05-01 VITALS — WEIGHT: 202.9 LBS | BODY MASS INDEX: 32.75 KG/M2 | DIASTOLIC BLOOD PRESSURE: 78 MMHG | SYSTOLIC BLOOD PRESSURE: 124 MMHG

## 2025-05-01 DIAGNOSIS — Z3A.39 39 WEEKS GESTATION OF PREGNANCY: ICD-10-CM

## 2025-05-01 DIAGNOSIS — Z34.03 ENCOUNTER FOR SUPERVISION OF NORMAL FIRST PREGNANCY IN THIRD TRIMESTER: Primary | ICD-10-CM

## 2025-05-01 NOTE — PROGRESS NOTES
Good fetal movement  No contractions but feeling more pressure  Cervix unchanged, soft  Reviewed GBS positive  Labor instructions    Diagnoses and all orders for this visit:    1. Encounter for supervision of normal first pregnancy in third trimester (Primary)    2. 39 weeks gestation of pregnancy

## 2025-05-05 ENCOUNTER — TELEPHONE (OUTPATIENT)
Dept: OBSTETRICS AND GYNECOLOGY | Age: 35
End: 2025-05-05
Payer: COMMERCIAL

## 2025-05-05 NOTE — TELEPHONE ENCOUNTER
Caller: Lee Irizarry    Relationship to patient: Self    Best call back number: [phone]    Patient is needing: PATIENT CALLED BACK WITH FAX NUMBER FOR LETTER REGARDING TIME OFF WORK    FAX# [phone]

## 2025-05-05 NOTE — TELEPHONE ENCOUNTER
Caller: Lee Irizarry    Relationship: Self    Best call back number: [phone]     What form or medical record are you requesting: EXCUSE FROM WORK    Who is requesting this form or medical record from you: PATIENT'S EMPLOYER    How would you like to receive the form or medical records (pick-up, mail, fax): FAX  If fax, what is the fax number: PATIENT WILL CALL BACK WITH THIS NUMBER    Timeframe paperwork needed: ASAP    Additional notes: PATIENT CURRENTLY 40 WEEKS PREGNANT AND IS REQUESTING A LETTER EXCUSING HER FROM WORK DUE TO PATIENT BEING MORE FATIGUE AND STATES IT IS HARDER TO GET UP IN THE MORNINGS.  PATIENT IS REQUESTING THE EXCUSE BE FOR TUESDAY, MAY 6TH AND WEDNESDAY, MAY 7TH AND THEN PATIENT IS SCHEDULED TO SEE DR. TERRAZAS ON THURSDAY, MAY 8TH AND STATES SHE CAN DISCUSS FURTHER STEPS WITH DR. TERRAZAS AT THAT TIME.  PATIENT STATES SHE WILL CALL HR AND GET THE FAX NUMBER AND WILL GIVE A CALL BACK WITH THIS NUMBER.

## 2025-05-05 NOTE — LETTER
May 5, 2025     Patient: Lee Irizarry   YOB: 1990   Date of Visit: 5/5/2025       To Whom It May Concern:    It is my medical opinion that Lee Irizarry  should be excused from work May 6th and May 7th 2025 .           Sincerely,        Marcin Davis MD    CC: No Recipients

## 2025-05-08 ENCOUNTER — HOSPITAL ENCOUNTER (INPATIENT)
Facility: HOSPITAL | Age: 35
LOS: 3 days | Discharge: HOME | End: 2025-05-11
Attending: OBSTETRICS & GYNECOLOGY | Admitting: OBSTETRICS & GYNECOLOGY
Payer: COMMERCIAL

## 2025-05-08 ENCOUNTER — ANESTHESIA (OUTPATIENT)
Dept: LABOR AND DELIVERY | Facility: HOSPITAL | Age: 35
End: 2025-05-08
Payer: COMMERCIAL

## 2025-05-08 ENCOUNTER — ANESTHESIA EVENT (OUTPATIENT)
Dept: LABOR AND DELIVERY | Facility: HOSPITAL | Age: 35
End: 2025-05-08
Payer: COMMERCIAL

## 2025-05-08 DIAGNOSIS — G89.18 POST-OP PAIN: Primary | ICD-10-CM

## 2025-05-08 PROBLEM — Z34.90 PREGNANCY: Status: ACTIVE | Noted: 2025-05-08

## 2025-05-08 LAB
ABO GROUP BLD: NORMAL
AMPHET+METHAMPHET UR QL: NEGATIVE
AMPHETAMINES UR QL: NEGATIVE
BARBITURATES UR QL SCN: NEGATIVE
BASOPHILS # BLD AUTO: 0.05 10*3/MM3 (ref 0–0.2)
BASOPHILS NFR BLD AUTO: 0.4 % (ref 0–1.5)
BENZODIAZ UR QL SCN: NEGATIVE
BLD GP AB SCN SERPL QL: NEGATIVE
BUPRENORPHINE SERPL-MCNC: NEGATIVE NG/ML
CANNABINOIDS SERPL QL: NEGATIVE
COCAINE UR QL: NEGATIVE
DEPRECATED RDW RBC AUTO: 51.8 FL (ref 37–54)
EOSINOPHIL # BLD AUTO: 0.14 10*3/MM3 (ref 0–0.4)
EOSINOPHIL NFR BLD AUTO: 1.1 % (ref 0.3–6.2)
ERYTHROCYTE [DISTWIDTH] IN BLOOD BY AUTOMATED COUNT: 16.2 % (ref 12.3–15.4)
FENTANYL UR-MCNC: NEGATIVE NG/ML
HCT VFR BLD AUTO: 35.2 % (ref 34–46.6)
HGB BLD-MCNC: 11.7 G/DL (ref 12–15.9)
IMM GRANULOCYTES # BLD AUTO: 0.15 10*3/MM3 (ref 0–0.05)
IMM GRANULOCYTES NFR BLD AUTO: 1.2 % (ref 0–0.5)
LYMPHOCYTES # BLD AUTO: 1.9 10*3/MM3 (ref 0.7–3.1)
LYMPHOCYTES NFR BLD AUTO: 14.9 % (ref 19.6–45.3)
MCH RBC QN AUTO: 29.1 PG (ref 26.6–33)
MCHC RBC AUTO-ENTMCNC: 33.2 G/DL (ref 31.5–35.7)
MCV RBC AUTO: 87.6 FL (ref 79–97)
METHADONE UR QL SCN: NEGATIVE
MONOCYTES # BLD AUTO: 1.09 10*3/MM3 (ref 0.1–0.9)
MONOCYTES NFR BLD AUTO: 8.5 % (ref 5–12)
NEUTROPHILS NFR BLD AUTO: 73.9 % (ref 42.7–76)
NEUTROPHILS NFR BLD AUTO: 9.42 10*3/MM3 (ref 1.7–7)
NRBC BLD AUTO-RTO: 0 /100 WBC (ref 0–0.2)
OPIATES UR QL: NEGATIVE
OXYCODONE UR QL SCN: NEGATIVE
PCP UR QL SCN: NEGATIVE
PLATELET # BLD AUTO: 256 10*3/MM3 (ref 140–450)
PMV BLD AUTO: 11.9 FL (ref 6–12)
RBC # BLD AUTO: 4.02 10*6/MM3 (ref 3.77–5.28)
RH BLD: POSITIVE
T&S EXPIRATION DATE: NORMAL
TREPONEMA PALLIDUM IGG+IGM AB [PRESENCE] IN SERUM OR PLASMA BY IMMUNOASSAY: NORMAL
TRICYCLICS UR QL SCN: NEGATIVE
WBC NRBC COR # BLD AUTO: 12.75 10*3/MM3 (ref 3.4–10.8)

## 2025-05-08 PROCEDURE — 25810000003 LACTATED RINGERS PER 1000 ML: Performed by: OBSTETRICS & GYNECOLOGY

## 2025-05-08 PROCEDURE — 25010000002 ROPIVACAINE PER 1 MG: Performed by: NURSE ANESTHETIST, CERTIFIED REGISTERED

## 2025-05-08 PROCEDURE — 99202 OFFICE O/P NEW SF 15 MIN: CPT | Performed by: OBSTETRICS & GYNECOLOGY

## 2025-05-08 PROCEDURE — 86900 BLOOD TYPING SEROLOGIC ABO: CPT | Performed by: OBSTETRICS & GYNECOLOGY

## 2025-05-08 PROCEDURE — 85025 COMPLETE CBC W/AUTO DIFF WBC: CPT | Performed by: OBSTETRICS & GYNECOLOGY

## 2025-05-08 PROCEDURE — 25010000002 BUTORPHANOL PER 1 MG: Performed by: OBSTETRICS & GYNECOLOGY

## 2025-05-08 PROCEDURE — 25010000002 MORPHINE PER 10 MG: Performed by: OBSTETRICS & GYNECOLOGY

## 2025-05-08 PROCEDURE — 25010000002 CLINDAMYCIN 600 MG/50ML SOLUTION: Performed by: OBSTETRICS & GYNECOLOGY

## 2025-05-08 PROCEDURE — 86780 TREPONEMA PALLIDUM: CPT | Performed by: OBSTETRICS & GYNECOLOGY

## 2025-05-08 PROCEDURE — 80307 DRUG TEST PRSMV CHEM ANLYZR: CPT | Performed by: OBSTETRICS & GYNECOLOGY

## 2025-05-08 PROCEDURE — C1755 CATHETER, INTRASPINAL: HCPCS | Performed by: NURSE ANESTHETIST, CERTIFIED REGISTERED

## 2025-05-08 PROCEDURE — 86901 BLOOD TYPING SEROLOGIC RH(D): CPT | Performed by: OBSTETRICS & GYNECOLOGY

## 2025-05-08 PROCEDURE — 86850 RBC ANTIBODY SCREEN: CPT | Performed by: OBSTETRICS & GYNECOLOGY

## 2025-05-08 PROCEDURE — 51702 INSERT TEMP BLADDER CATH: CPT

## 2025-05-08 PROCEDURE — 25010000002 CEFAZOLIN PER 500 MG: Performed by: OBSTETRICS & GYNECOLOGY

## 2025-05-08 PROCEDURE — 25810000003 LACTATED RINGERS SOLUTION: Performed by: OBSTETRICS & GYNECOLOGY

## 2025-05-08 RX ORDER — SODIUM CHLORIDE 9 MG/ML
40 INJECTION, SOLUTION INTRAVENOUS AS NEEDED
Status: DISCONTINUED | OUTPATIENT
Start: 2025-05-08 | End: 2025-05-09 | Stop reason: HOSPADM

## 2025-05-08 RX ORDER — ACETAMINOPHEN 325 MG/1
650 TABLET ORAL EVERY 4 HOURS PRN
Status: DISCONTINUED | OUTPATIENT
Start: 2025-05-08 | End: 2025-05-09 | Stop reason: HOSPADM

## 2025-05-08 RX ORDER — EPHEDRINE SULFATE 50 MG/ML
10 INJECTION, SOLUTION INTRAVENOUS
Status: DISCONTINUED | OUTPATIENT
Start: 2025-05-08 | End: 2025-05-09 | Stop reason: HOSPADM

## 2025-05-08 RX ORDER — FAMOTIDINE 10 MG/ML
20 INJECTION, SOLUTION INTRAVENOUS ONCE AS NEEDED
Status: CANCELLED | OUTPATIENT
Start: 2025-05-08

## 2025-05-08 RX ORDER — CITRIC ACID/SODIUM CITRATE 334-500MG
30 SOLUTION, ORAL ORAL ONCE AS NEEDED
Status: DISCONTINUED | OUTPATIENT
Start: 2025-05-08 | End: 2025-05-09 | Stop reason: HOSPADM

## 2025-05-08 RX ORDER — BUTORPHANOL TARTRATE 2 MG/ML
1 INJECTION, SOLUTION INTRAMUSCULAR; INTRAVENOUS
Status: DISCONTINUED | OUTPATIENT
Start: 2025-05-08 | End: 2025-05-08

## 2025-05-08 RX ORDER — ONDANSETRON 2 MG/ML
4 INJECTION INTRAMUSCULAR; INTRAVENOUS EVERY 6 HOURS PRN
OUTPATIENT
Start: 2025-05-08

## 2025-05-08 RX ORDER — ROPIVACAINE HYDROCHLORIDE 2 MG/ML
INJECTION, SOLUTION EPIDURAL; INFILTRATION; PERINEURAL AS NEEDED
Status: DISCONTINUED | OUTPATIENT
Start: 2025-05-08 | End: 2025-05-09 | Stop reason: SURG

## 2025-05-08 RX ORDER — ONDANSETRON 2 MG/ML
4 INJECTION INTRAMUSCULAR; INTRAVENOUS EVERY 6 HOURS PRN
Status: DISCONTINUED | OUTPATIENT
Start: 2025-05-08 | End: 2025-05-09 | Stop reason: HOSPADM

## 2025-05-08 RX ORDER — ONDANSETRON 4 MG/1
4 TABLET, ORALLY DISINTEGRATING ORAL EVERY 6 HOURS PRN
OUTPATIENT
Start: 2025-05-08

## 2025-05-08 RX ORDER — OXYTOCIN/0.9 % SODIUM CHLORIDE 30/500 ML
2-20 PLASTIC BAG, INJECTION (ML) INTRAVENOUS
Status: DISCONTINUED | OUTPATIENT
Start: 2025-05-08 | End: 2025-05-09

## 2025-05-08 RX ORDER — SODIUM CHLORIDE 0.9 % (FLUSH) 0.9 %
10 SYRINGE (ML) INJECTION AS NEEDED
Status: DISCONTINUED | OUTPATIENT
Start: 2025-05-08 | End: 2025-05-09 | Stop reason: HOSPADM

## 2025-05-08 RX ORDER — CALCIUM CARBONATE 500 MG/1
2 TABLET, CHEWABLE ORAL 3 TIMES DAILY PRN
OUTPATIENT
Start: 2025-05-08

## 2025-05-08 RX ORDER — CLINDAMYCIN PHOSPHATE 600 MG/50ML
600 INJECTION, SOLUTION INTRAVENOUS EVERY 8 HOURS
Status: DISCONTINUED | OUTPATIENT
Start: 2025-05-08 | End: 2025-05-08

## 2025-05-08 RX ORDER — SODIUM CHLORIDE, SODIUM LACTATE, POTASSIUM CHLORIDE, CALCIUM CHLORIDE 600; 310; 30; 20 MG/100ML; MG/100ML; MG/100ML; MG/100ML
125 INJECTION, SOLUTION INTRAVENOUS CONTINUOUS
Status: DISCONTINUED | OUTPATIENT
Start: 2025-05-08 | End: 2025-05-09

## 2025-05-08 RX ORDER — ONDANSETRON 4 MG/1
4 TABLET, ORALLY DISINTEGRATING ORAL EVERY 6 HOURS PRN
Status: DISCONTINUED | OUTPATIENT
Start: 2025-05-08 | End: 2025-05-09 | Stop reason: HOSPADM

## 2025-05-08 RX ORDER — CITRIC ACID/SODIUM CITRATE 334-500MG
30 SOLUTION, ORAL ORAL ONCE
Status: DISCONTINUED | OUTPATIENT
Start: 2025-05-08 | End: 2025-05-09 | Stop reason: HOSPADM

## 2025-05-08 RX ORDER — IBUPROFEN 600 MG/1
600 TABLET, FILM COATED ORAL EVERY 6 HOURS PRN
OUTPATIENT
Start: 2025-05-08

## 2025-05-08 RX ORDER — BUTORPHANOL TARTRATE 2 MG/ML
2 INJECTION, SOLUTION INTRAMUSCULAR; INTRAVENOUS
Status: DISCONTINUED | OUTPATIENT
Start: 2025-05-08 | End: 2025-05-08

## 2025-05-08 RX ORDER — LIDOCAINE HYDROCHLORIDE 20 MG/ML
10 INJECTION, SOLUTION INFILTRATION; PERINEURAL ONCE AS NEEDED
Status: DISCONTINUED | OUTPATIENT
Start: 2025-05-08 | End: 2025-05-09

## 2025-05-08 RX ORDER — LIDOCAINE HYDROCHLORIDE AND EPINEPHRINE 15; 5 MG/ML; UG/ML
INJECTION, SOLUTION EPIDURAL AS NEEDED
Status: DISCONTINUED | OUTPATIENT
Start: 2025-05-08 | End: 2025-05-09 | Stop reason: SURG

## 2025-05-08 RX ORDER — TERBUTALINE SULFATE 1 MG/ML
0.25 INJECTION SUBCUTANEOUS AS NEEDED
Status: DISCONTINUED | OUTPATIENT
Start: 2025-05-08 | End: 2025-05-09 | Stop reason: HOSPADM

## 2025-05-08 RX ORDER — MORPHINE SULFATE 10 MG/ML
10 INJECTION INTRAMUSCULAR; INTRAVENOUS; SUBCUTANEOUS
Status: DISCONTINUED | OUTPATIENT
Start: 2025-05-08 | End: 2025-05-09

## 2025-05-08 RX ORDER — SODIUM CHLORIDE 0.9 % (FLUSH) 0.9 %
10 SYRINGE (ML) INJECTION EVERY 12 HOURS SCHEDULED
Status: DISCONTINUED | OUTPATIENT
Start: 2025-05-08 | End: 2025-05-09 | Stop reason: HOSPADM

## 2025-05-08 RX ADMIN — CEFAZOLIN 2000 MG: 2 INJECTION, POWDER, FOR SOLUTION INTRAMUSCULAR; INTRAVENOUS at 08:15

## 2025-05-08 RX ADMIN — CEFAZOLIN 2000 MG: 2 INJECTION, POWDER, FOR SOLUTION INTRAMUSCULAR; INTRAVENOUS at 14:20

## 2025-05-08 RX ADMIN — MORPHINE SULFATE 10 MG: 10 INJECTION INTRAVENOUS at 13:20

## 2025-05-08 RX ADMIN — Medication 2 MILLI-UNITS/MIN: at 12:11

## 2025-05-08 RX ADMIN — Medication 2 MILLI-UNITS/MIN: at 23:00

## 2025-05-08 RX ADMIN — MORPHINE SULFATE 10 MG: 10 INJECTION INTRAVENOUS at 15:26

## 2025-05-08 RX ADMIN — CEFAZOLIN 2000 MG: 2 INJECTION, POWDER, FOR SOLUTION INTRAMUSCULAR; INTRAVENOUS at 20:24

## 2025-05-08 RX ADMIN — CLINDAMYCIN PHOSPHATE 600 MG: 600 INJECTION, SOLUTION INTRAVENOUS at 02:34

## 2025-05-08 RX ADMIN — ROPIVACAINE HYDROCHLORIDE 5 ML: 2 INJECTION, SOLUTION EPIDURAL; INFILTRATION at 18:48

## 2025-05-08 RX ADMIN — SODIUM CHLORIDE, POTASSIUM CHLORIDE, SODIUM LACTATE AND CALCIUM CHLORIDE 1000 ML: 600; 310; 30; 20 INJECTION, SOLUTION INTRAVENOUS at 18:38

## 2025-05-08 RX ADMIN — SODIUM CHLORIDE, POTASSIUM CHLORIDE, SODIUM LACTATE AND CALCIUM CHLORIDE 125 ML/HR: 600; 310; 30; 20 INJECTION, SOLUTION INTRAVENOUS at 20:24

## 2025-05-08 RX ADMIN — SODIUM CHLORIDE, POTASSIUM CHLORIDE, SODIUM LACTATE AND CALCIUM CHLORIDE 125 ML/HR: 600; 310; 30; 20 INJECTION, SOLUTION INTRAVENOUS at 02:22

## 2025-05-08 RX ADMIN — ROPIVACAINE HYDROCHLORIDE 6 ML/HR: 2 INJECTION, SOLUTION EPIDURAL; INFILTRATION at 18:53

## 2025-05-08 RX ADMIN — BUTORPHANOL TARTRATE 1 MG: 2 INJECTION, SOLUTION INTRAMUSCULAR; INTRAVENOUS at 10:26

## 2025-05-08 RX ADMIN — SODIUM CHLORIDE, POTASSIUM CHLORIDE, SODIUM LACTATE AND CALCIUM CHLORIDE 125 ML/HR: 600; 310; 30; 20 INJECTION, SOLUTION INTRAVENOUS at 23:28

## 2025-05-08 RX ADMIN — LIDOCAINE HYDROCHLORIDE AND EPINEPHRINE 3 ML: 15; 5 INJECTION, SOLUTION EPIDURAL at 18:40

## 2025-05-08 RX ADMIN — SODIUM CHLORIDE, POTASSIUM CHLORIDE, SODIUM LACTATE AND CALCIUM CHLORIDE 125 ML/HR: 600; 310; 30; 20 INJECTION, SOLUTION INTRAVENOUS at 09:37

## 2025-05-08 NOTE — PLAN OF CARE
Goal Outcome Evaluation:      PT arrived to LDR with complaints of leaking fluids. Positive Nitrazine and ferning., 40w3d, GBS positive, A+. Allergic to amoxicillin, receiving clindamycin q8hr for GBS+ result. LR currently infusing @ 125mL/hr. Pt states water broke around 0100 with no odor, meconium color. @0202 cervix exam /-3.

## 2025-05-08 NOTE — PROGRESS NOTES
Marcin Davis MD  [facility]  [address]  [place] [address]  Office [phone]  Fax [phone]      [facility]  Lee Irizarry  : 1990  MRN: [id number]  CSN: [id number]    Labor progress note    Subjective   She reports is feeling painful contraction, and has mostly centrally received morphine for labor pain     Objective   Min/max vitals past 24 hours:  Temp  Min: 97.7 °F (36.5 °C)  Max: 99 °F (37.2 °C)   BP  Min: 95/58  Max: 148/86   Pulse  Min: 90  Max: 113   Resp  Min: 14  Max: 20        FHT's: reactive and category 1.  external monitors used   Cervix: was checked (by me): 8 cm / 90 % / -1  Difficult to tell fetal position due to maternal positioning at this time, but the anterior cervix feels somewhat swollen   Contractions: regular every 3 minutes  Pitocin at 4      Assessment   IUP at 40w3d  Active labor     Plan   1.   Continue with augmentation  OK for epidural  Allow labor to continue pending maternal and fetal status  Plan discussed with family and questions answered.  Understanding verbalized.    Marcin Davis MD  2025  17:21 CDT

## 2025-05-08 NOTE — ED NOTES
[facility]   Lee Irizarry  : 1990  MRN: [id number]  [facility]: [id number]    History and Physical    Subjective   Lee Irizarry is a 34 y.o.  40 week gestation came to [facility] due to leaking fluid, no stop, some contractions reported, no bleeding,  good mov.  Past hx is negative, prenatal care unremarkable.    Past Medical History:   Diagnosis Date    Seasonal allergies      History reviewed. No pertinent surgical history.  Social History    Tobacco Use      Smoking status: Never        Passive exposure: Never      Smokeless tobacco: Never      Current Facility-Administered Medications:     acetaminophen (TYLENOL) tablet 650 mg, 650 mg, Oral, Q4H PRN, Esdras Patel MD    butorphanol (STADOL) injection 1 mg, 1 mg, Intravenous, Q3H PRN, Esdras Patel MD    butorphanol (STADOL) injection 2 mg, 2 mg, Intravenous, Q3H PRN, Esdras Patel MD    clindamycin (CLEOCIN) 600 mg in dextrose 5% 50 mL IVPB (premix), 600 mg, Intravenous, Q8H, Esdras Patel MD, Last Rate: 100 mL/hr at 25 0234, 600 mg at 25 0234    lactated ringers bolus 1,000 mL, 1,000 mL, Intravenous, Once PRN, Esdras Patel MD    lactated ringers infusion, 125 mL/hr, Intravenous, Continuous, Esdras Patel MD, Last Rate: 125 mL/hr at 25 0222, 125 mL/hr at 25 0222    ondansetron ODT (ZOFRAN-ODT) disintegrating tablet 4 mg, 4 mg, Oral, Q6H PRN **OR** ondansetron (ZOFRAN) injection 4 mg, 4 mg, Intravenous, Q6H PRN, Esdras Patel MD    Sod Citrate-Citric Acid (BICITRA) oral solution 30 mL, 30 mL, Oral, Once PRN, Esdras Patel MD    sodium chloride 0.9 % flush 10 mL, 10 mL, Intravenous, Q12H, Esdras Patel MD    sodium chloride 0.9 % flush 10 mL, 10 mL, Intravenous, PRN, Esdras Patel MD    sodium chloride 0.9 % infusion 40 mL, 40 mL, Intravenous, PRN, Esdras Patel MD    terbutaline (BRETHINE) injection 0.25 mg, 0.25 mg, Subcutaneous, PRN, Esdras Patel,  "MD    Allergies   Allergen Reactions    Amoxicillin Rash     unknown       Review of Systems      Objective   /66 (BP Location: Right arm, Patient Position: Sitting)   Pulse 102   Temp 97.7 °F (36.5 °C) (Oral)   Resp 16   Ht 167.6 cm (66\")   Wt 91.6 kg (202 lb)   SpO2 97%   BMI 32.60 kg/m²   General: well developed; well nourished  no acute distress  mentation appropriate   Heart: regular rate and rhythm, S1, S2 normal, no murmur, click, rub or gallop   Lungs: breathing is unlabored   Abdomen: soft, non-tender; no masses  no umbilical or inguinal hernias are present  no hepato-splenomegaly   Pelvis:: Clinical staff was present for exam  Pos nitrazine and ferning  1/50/-2 vertex/ srom    Gbs positive, allergic to pcn   Labs  Lab Results   Component Value Date     05/08/2025    HGB 11.7 (L) 05/08/2025    HCT 35.2 05/08/2025    WBC 12.75 (H) 05/08/2025     (L) 10/20/2024    K 3.4 (L) 10/20/2024     10/20/2024    CO2 23.0 10/20/2024    BUN 7 10/20/2024    CREATININE 0.43 (L) 10/20/2024    GLUCOSE 68 10/20/2024    ALBUMIN 4.2 10/20/2024    CALCIUM 9.5 10/20/2024    AST 16 10/20/2024    ALT 9 10/20/2024    BILITOT 0.5 10/20/2024        Assessment   Term pregnancy  Srom  Gbs positive, allergic to pcn    The risks, benefits, and alternatives of the procedure; along with the risks of anesthesia was discussed in full with the patient and family and all questions were answered.       Plan   Admit, possible pit augmentation    Esdras Patel MD  5/8/2025  05:42 CDT          "

## 2025-05-09 PROCEDURE — 25010000002 CEFAZOLIN PER 500 MG: Performed by: OBSTETRICS & GYNECOLOGY

## 2025-05-09 PROCEDURE — 25010000002 LIDOCAINE PF 2% 2 % SOLUTION: Performed by: NURSE ANESTHETIST, CERTIFIED REGISTERED

## 2025-05-09 PROCEDURE — 59510 CESAREAN DELIVERY: CPT | Performed by: OBSTETRICS & GYNECOLOGY

## 2025-05-09 PROCEDURE — 25010000002 AZITHROMYCIN PER 500 MG: Performed by: OBSTETRICS & GYNECOLOGY

## 2025-05-09 PROCEDURE — 25810000003 SODIUM CHLORIDE 0.9 % SOLUTION 250 ML FLEX CONT: Performed by: OBSTETRICS & GYNECOLOGY

## 2025-05-09 PROCEDURE — 94799 UNLISTED PULMONARY SVC/PX: CPT

## 2025-05-09 PROCEDURE — 25010000002 METOCLOPRAMIDE PER 10 MG: Performed by: OBSTETRICS & GYNECOLOGY

## 2025-05-09 PROCEDURE — 25010000002 ONDANSETRON PER 1 MG: Performed by: NURSE ANESTHETIST, CERTIFIED REGISTERED

## 2025-05-09 PROCEDURE — 25010000002 ROPIVACAINE PER 1 MG: Performed by: NURSE ANESTHETIST, CERTIFIED REGISTERED

## 2025-05-09 PROCEDURE — 25010000002 OXYTOCIN PER 10 UNITS: Performed by: NURSE ANESTHETIST, CERTIFIED REGISTERED

## 2025-05-09 PROCEDURE — 25010000002 KETOROLAC TROMETHAMINE PER 15 MG: Performed by: OBSTETRICS & GYNECOLOGY

## 2025-05-09 PROCEDURE — 25010000002 KETOROLAC TROMETHAMINE PER 15 MG: Performed by: NURSE ANESTHETIST, CERTIFIED REGISTERED

## 2025-05-09 PROCEDURE — 25010000002 HYDROMORPHONE 1 MG/ML SOLUTION: Performed by: NURSE ANESTHETIST, CERTIFIED REGISTERED

## 2025-05-09 PROCEDURE — 25010000002 FAMOTIDINE 10 MG/ML SOLUTION: Performed by: OBSTETRICS & GYNECOLOGY

## 2025-05-09 RX ORDER — CETIRIZINE HYDROCHLORIDE 10 MG/1
10 TABLET ORAL DAILY
Status: DISCONTINUED | OUTPATIENT
Start: 2025-05-09 | End: 2025-05-11 | Stop reason: HOSPADM

## 2025-05-09 RX ORDER — OXYTOCIN/0.9 % SODIUM CHLORIDE 30/500 ML
125 PLASTIC BAG, INJECTION (ML) INTRAVENOUS ONCE AS NEEDED
Status: DISCONTINUED | OUTPATIENT
Start: 2025-05-09 | End: 2025-05-11 | Stop reason: HOSPADM

## 2025-05-09 RX ORDER — KETOROLAC TROMETHAMINE 30 MG/ML
INJECTION, SOLUTION INTRAMUSCULAR; INTRAVENOUS AS NEEDED
Status: DISCONTINUED | OUTPATIENT
Start: 2025-05-09 | End: 2025-05-09 | Stop reason: SURG

## 2025-05-09 RX ORDER — OXYTOCIN 10 [USP'U]/ML
INJECTION, SOLUTION INTRAMUSCULAR; INTRAVENOUS AS NEEDED
Status: DISCONTINUED | OUTPATIENT
Start: 2025-05-09 | End: 2025-05-09 | Stop reason: SURG

## 2025-05-09 RX ORDER — MISOPROSTOL 200 UG/1
800 TABLET ORAL ONCE AS NEEDED
Status: DISCONTINUED | OUTPATIENT
Start: 2025-05-09 | End: 2025-05-11 | Stop reason: HOSPADM

## 2025-05-09 RX ORDER — CITRIC ACID/SODIUM CITRATE 334-500MG
30 SOLUTION, ORAL ORAL ONCE
Status: COMPLETED | OUTPATIENT
Start: 2025-05-09 | End: 2025-05-09

## 2025-05-09 RX ORDER — IBUPROFEN 600 MG/1
600 TABLET, FILM COATED ORAL EVERY 6 HOURS
Status: DISCONTINUED | OUTPATIENT
Start: 2025-05-10 | End: 2025-05-11 | Stop reason: HOSPADM

## 2025-05-09 RX ORDER — CARBOPROST TROMETHAMINE 250 UG/ML
250 INJECTION, SOLUTION INTRAMUSCULAR AS NEEDED
Status: DISCONTINUED | OUTPATIENT
Start: 2025-05-09 | End: 2025-05-09 | Stop reason: HOSPADM

## 2025-05-09 RX ORDER — ROPIVACAINE HYDROCHLORIDE 5 MG/ML
INJECTION, SOLUTION EPIDURAL; INFILTRATION; PERINEURAL AS NEEDED
Status: DISCONTINUED | OUTPATIENT
Start: 2025-05-09 | End: 2025-05-09 | Stop reason: SURG

## 2025-05-09 RX ORDER — METHYLERGONOVINE MALEATE 0.2 MG/ML
200 INJECTION INTRAVENOUS ONCE AS NEEDED
Status: DISCONTINUED | OUTPATIENT
Start: 2025-05-09 | End: 2025-05-09 | Stop reason: HOSPADM

## 2025-05-09 RX ORDER — FAMOTIDINE 10 MG/ML
20 INJECTION, SOLUTION INTRAVENOUS ONCE
Status: COMPLETED | OUTPATIENT
Start: 2025-05-09 | End: 2025-05-09

## 2025-05-09 RX ORDER — ACETAMINOPHEN 500 MG
1000 TABLET ORAL EVERY 6 HOURS SCHEDULED
Status: DISPENSED | OUTPATIENT
Start: 2025-05-09 | End: 2025-05-10

## 2025-05-09 RX ORDER — LIDOCAINE HYDROCHLORIDE 20 MG/ML
INJECTION, SOLUTION EPIDURAL; INFILTRATION; INTRACAUDAL; PERINEURAL AS NEEDED
Status: DISCONTINUED | OUTPATIENT
Start: 2025-05-09 | End: 2025-05-09 | Stop reason: SURG

## 2025-05-09 RX ORDER — ONDANSETRON 2 MG/ML
4 INJECTION INTRAMUSCULAR; INTRAVENOUS EVERY 6 HOURS PRN
Status: DISCONTINUED | OUTPATIENT
Start: 2025-05-09 | End: 2025-05-11 | Stop reason: HOSPADM

## 2025-05-09 RX ORDER — PRENATAL VIT/IRON FUM/FOLIC AC 27MG-0.8MG
1 TABLET ORAL DAILY
Status: DISCONTINUED | OUTPATIENT
Start: 2025-05-09 | End: 2025-05-11 | Stop reason: HOSPADM

## 2025-05-09 RX ORDER — ACETAMINOPHEN 500 MG
1000 TABLET ORAL ONCE
Status: DISCONTINUED | OUTPATIENT
Start: 2025-05-09 | End: 2025-05-09

## 2025-05-09 RX ORDER — OXYTOCIN/0.9 % SODIUM CHLORIDE 30/500 ML
250 PLASTIC BAG, INJECTION (ML) INTRAVENOUS CONTINUOUS
Status: ACTIVE | OUTPATIENT
Start: 2025-05-09 | End: 2025-05-09

## 2025-05-09 RX ORDER — CARBOPROST TROMETHAMINE 250 UG/ML
250 INJECTION, SOLUTION INTRAMUSCULAR ONCE AS NEEDED
Status: DISCONTINUED | OUTPATIENT
Start: 2025-05-09 | End: 2025-05-11 | Stop reason: HOSPADM

## 2025-05-09 RX ORDER — OXYCODONE HYDROCHLORIDE 5 MG/1
10 TABLET ORAL EVERY 4 HOURS PRN
Status: DISCONTINUED | OUTPATIENT
Start: 2025-05-09 | End: 2025-05-11 | Stop reason: HOSPADM

## 2025-05-09 RX ORDER — MONTELUKAST SODIUM 10 MG/1
10 TABLET ORAL NIGHTLY
Status: DISCONTINUED | OUTPATIENT
Start: 2025-05-09 | End: 2025-05-11 | Stop reason: HOSPADM

## 2025-05-09 RX ORDER — METOCLOPRAMIDE HYDROCHLORIDE 5 MG/ML
10 INJECTION INTRAMUSCULAR; INTRAVENOUS EVERY 4 HOURS PRN
Status: ACTIVE | OUTPATIENT
Start: 2025-05-09 | End: 2025-05-10

## 2025-05-09 RX ORDER — OXYCODONE HYDROCHLORIDE 5 MG/1
5 TABLET ORAL EVERY 4 HOURS PRN
Status: DISCONTINUED | OUTPATIENT
Start: 2025-05-09 | End: 2025-05-11 | Stop reason: HOSPADM

## 2025-05-09 RX ORDER — KETOROLAC TROMETHAMINE 15 MG/ML
15 INJECTION, SOLUTION INTRAMUSCULAR; INTRAVENOUS EVERY 6 HOURS
Status: DISPENSED | OUTPATIENT
Start: 2025-05-09 | End: 2025-05-10

## 2025-05-09 RX ORDER — METHYLERGONOVINE MALEATE 0.2 MG/ML
200 INJECTION INTRAVENOUS AS NEEDED
Status: DISCONTINUED | OUTPATIENT
Start: 2025-05-09 | End: 2025-05-11 | Stop reason: HOSPADM

## 2025-05-09 RX ORDER — NALBUPHINE HYDROCHLORIDE 10 MG/ML
2.5 INJECTION INTRAMUSCULAR; INTRAVENOUS; SUBCUTANEOUS EVERY 4 HOURS PRN
Status: DISCONTINUED | OUTPATIENT
Start: 2025-05-09 | End: 2025-05-09 | Stop reason: RX

## 2025-05-09 RX ORDER — ONDANSETRON 2 MG/ML
4 INJECTION INTRAMUSCULAR; INTRAVENOUS EVERY 6 HOURS PRN
Status: DISCONTINUED | OUTPATIENT
Start: 2025-05-09 | End: 2025-05-09 | Stop reason: SDUPTHER

## 2025-05-09 RX ORDER — NALOXONE HCL 0.4 MG/ML
0.4 VIAL (ML) INJECTION
Status: ACTIVE | OUTPATIENT
Start: 2025-05-09 | End: 2025-05-10

## 2025-05-09 RX ORDER — HYDROMORPHONE HYDROCHLORIDE 1 MG/ML
0.5 INJECTION, SOLUTION INTRAMUSCULAR; INTRAVENOUS; SUBCUTANEOUS
Status: DISCONTINUED | OUTPATIENT
Start: 2025-05-09 | End: 2025-05-10

## 2025-05-09 RX ORDER — SIMETHICONE 80 MG
80 TABLET,CHEWABLE ORAL 4 TIMES DAILY PRN
Status: DISCONTINUED | OUTPATIENT
Start: 2025-05-09 | End: 2025-05-11 | Stop reason: HOSPADM

## 2025-05-09 RX ORDER — MISOPROSTOL 200 UG/1
800 TABLET ORAL AS NEEDED
Status: DISCONTINUED | OUTPATIENT
Start: 2025-05-09 | End: 2025-05-09 | Stop reason: HOSPADM

## 2025-05-09 RX ORDER — PHENYLEPHRINE HCL IN 0.9% NACL 0.8MG/10ML
SYRINGE (ML) INTRAVENOUS AS NEEDED
Status: DISCONTINUED | OUTPATIENT
Start: 2025-05-09 | End: 2025-05-09 | Stop reason: SURG

## 2025-05-09 RX ORDER — METOCLOPRAMIDE HYDROCHLORIDE 5 MG/ML
10 INJECTION INTRAMUSCULAR; INTRAVENOUS ONCE
Status: COMPLETED | OUTPATIENT
Start: 2025-05-09 | End: 2025-05-09

## 2025-05-09 RX ORDER — ONDANSETRON 2 MG/ML
INJECTION INTRAMUSCULAR; INTRAVENOUS AS NEEDED
Status: DISCONTINUED | OUTPATIENT
Start: 2025-05-09 | End: 2025-05-09 | Stop reason: SURG

## 2025-05-09 RX ORDER — ONDANSETRON 4 MG/1
4 TABLET, ORALLY DISINTEGRATING ORAL EVERY 8 HOURS PRN
Status: DISCONTINUED | OUTPATIENT
Start: 2025-05-09 | End: 2025-05-11 | Stop reason: HOSPADM

## 2025-05-09 RX ORDER — DOCUSATE SODIUM 100 MG/1
100 CAPSULE, LIQUID FILLED ORAL 2 TIMES DAILY PRN
Status: DISCONTINUED | OUTPATIENT
Start: 2025-05-09 | End: 2025-05-11 | Stop reason: HOSPADM

## 2025-05-09 RX ORDER — NALOXONE HCL 0.4 MG/ML
0.1 VIAL (ML) INJECTION
Status: DISCONTINUED | OUTPATIENT
Start: 2025-05-09 | End: 2025-05-11 | Stop reason: HOSPADM

## 2025-05-09 RX ORDER — OXYTOCIN/0.9 % SODIUM CHLORIDE 30/500 ML
999 PLASTIC BAG, INJECTION (ML) INTRAVENOUS ONCE
Status: COMPLETED | OUTPATIENT
Start: 2025-05-09 | End: 2025-05-09

## 2025-05-09 RX ORDER — ACETAMINOPHEN 325 MG/1
650 TABLET ORAL EVERY 6 HOURS
Status: DISCONTINUED | OUTPATIENT
Start: 2025-05-10 | End: 2025-05-11 | Stop reason: HOSPADM

## 2025-05-09 RX ADMIN — SODIUM CITRATE AND CITRIC ACID MONOHYDRATE 30 ML: 500; 334 SOLUTION ORAL at 07:12

## 2025-05-09 RX ADMIN — ONDANSETRON 4 MG: 2 INJECTION INTRAMUSCULAR; INTRAVENOUS at 08:05

## 2025-05-09 RX ADMIN — KETOROLAC TROMETHAMINE 30 MG: 30 INJECTION, SOLUTION INTRAMUSCULAR; INTRAVENOUS at 08:05

## 2025-05-09 RX ADMIN — AZITHROMYCIN DIHYDRATE 500 MG: 500 INJECTION, POWDER, LYOPHILIZED, FOR SOLUTION INTRAVENOUS at 07:18

## 2025-05-09 RX ADMIN — METOCLOPRAMIDE 10 MG: 5 INJECTION, SOLUTION INTRAMUSCULAR; INTRAVENOUS at 07:15

## 2025-05-09 RX ADMIN — LIDOCAINE HYDROCHLORIDE 9 ML: 20 INJECTION, SOLUTION EPIDURAL; INFILTRATION; INTRACAUDAL; PERINEURAL at 07:03

## 2025-05-09 RX ADMIN — PRENATAL VIT W/ FE FUMARATE-FA TAB 27-0.8 MG 1 TABLET: 27-0.8 TAB at 11:35

## 2025-05-09 RX ADMIN — Medication 999 ML/HR: at 09:00

## 2025-05-09 RX ADMIN — KETOROLAC TROMETHAMINE 15 MG: 15 INJECTION, SOLUTION INTRAMUSCULAR; INTRAVENOUS at 21:02

## 2025-05-09 RX ADMIN — HYDROMORPHONE HYDROCHLORIDE 1 MG: 1 INJECTION, SOLUTION INTRAMUSCULAR; INTRAVENOUS; SUBCUTANEOUS at 07:51

## 2025-05-09 RX ADMIN — ROPIVACAINE HYDROCHLORIDE 9 ML: 5 INJECTION EPIDURAL; INFILTRATION; PERINEURAL at 07:03

## 2025-05-09 RX ADMIN — CEFAZOLIN 2000 MG: 2 INJECTION, POWDER, FOR SOLUTION INTRAMUSCULAR; INTRAVENOUS at 07:18

## 2025-05-09 RX ADMIN — Medication 100 MCG: at 07:32

## 2025-05-09 RX ADMIN — ACETAMINOPHEN 1000 MG: 500 TABLET, FILM COATED ORAL at 11:35

## 2025-05-09 RX ADMIN — FAMOTIDINE 20 MG: 10 INJECTION INTRAVENOUS at 07:14

## 2025-05-09 RX ADMIN — ACETAMINOPHEN 1000 MG: 500 TABLET, FILM COATED ORAL at 23:45

## 2025-05-09 RX ADMIN — CEFAZOLIN 2000 MG: 2 INJECTION, POWDER, FOR SOLUTION INTRAMUSCULAR; INTRAVENOUS at 02:53

## 2025-05-09 RX ADMIN — OXYTOCIN 20 UNITS: 10 INJECTION INTRAVENOUS at 07:45

## 2025-05-09 RX ADMIN — Medication 100 MCG: at 08:03

## 2025-05-09 RX ADMIN — Medication 100 MCG: at 08:00

## 2025-05-09 RX ADMIN — KETOROLAC TROMETHAMINE 15 MG: 15 INJECTION, SOLUTION INTRAMUSCULAR; INTRAVENOUS at 14:35

## 2025-05-09 NOTE — LACTATION NOTE
Mother's Name: Lee  Phone #: [phone]  Infant Name: Alyson  : 2025 at 0745  Gestation: 40-4  Day of life: 0(1 hour)  Birth weight:  7-2.3 (3240g)  Discharge weight:  Weight Loss:   24 hour Summary of Feeds:  Voids:  Stools:  Assistive devices (shields, shells, etc):  Significant Maternal history: G1, seasonal allergies   Maternal Concerns:   Maternal Goal: breastfeed   Mother's Medications: zyrtec, iron, flonase, singulair, PNV  Breastpump for home:   Ped follow up appt: Dr. Rahman     Called to [facility] to assist with first feeding. Infant finishing on left breast when this RN arrived. ADT nurse states that infant was at left breast and active for 20 mins. Much praise given for this!!   With permission, assisted mother to move infant to right breast in a cross cradle hold. Mother is very sleepy and states that she feels too weak to hold infant. Reassured that this RN is happy to help hold baby at breast. Infant very eager and easily latched to right breast and was active for 25 mins. Demonstrated how to break latch with finger before removing infant from breast. Infant placed skin to skin with mother. LDR RN and pt's mother at bedside. This RN plans to return to see pt for next feeding and to provide all initial breastfeeding education. Much encouragement given!     1215  Called to room to assist with feeding. Mother states that she has been attempting to feed infant for 15 mins. States that she has been active on right breast for 7 mins. With permission, assisted to move infant to left breast in football hold. Infant awake, holds breast in mouth with few suck/swallows observed. Attempted to feed on both breasts for 15 mins each. Infant uninterested. Encouraged to do skin to skin and attempt in an hour. Much encouragement given. Discussed initial breastfeeding education packet and book. Left lactation phone number and encouraged to call for assistance with next feeding.     4196  Called to room by pt to  assist with feeding. Infant skin to skin with mom upon arrival. With permission, assisted pt to move infant to right breast in football hold. Infant very disorganized with sucking/chomping. Attempted to suck train with gloved finger. Infant still very disorganized and mostly chomping on finger. Mother states that she is starting to get discouraged. Much encouragement provided that all of this is normal  behavior. Pt has several visitors that have been in the room with each feeding. Offered for pt to pump, give EBM via syringe, supplement with DBM, or formula. Pt choosing to pump at this time. This RN set up hospital grade pump, instructed on how to use, and pt began pumping at this time. Infant swaddled and being held by visitor at this time. Encouraged to give 5 ml EBM if she pumps that much. If not, encouraged to supplement with DBM or formula. Encouraged to call lactation after pumping for assistance if she desires. Much encouragement given!       Instructed patient our lactation team is here for continued support throughout their breastfeeding journey. Our team has encouraged patient to call with any questions or concerns that may arise after discharge.     Breastfeeding and Diaper Chart  Check List for Essentials of Positioning And Latch-on handout provided by Lactation Education Resources  Hand Expression handout provided by Lactation Education Resources  Five Keys to Successful Breastfeeding handout provided by Lactation Education Resources    The Many Benefits if Breastfeeding handout given  Breastfeeding saves time  *Breastfeeding allows you to calm or feed your baby immediately, which leads to a happier baby who cries less  *There is nothing to buy, prepare, or maintain.There is nothing to clean or sterilize.  Breastfeeding builds a mothers confidence  *She knows all her baby needs to thrive is her!  Breastfeeding saves Money  *There is no formula to buy and healthier breast fed babies have less  medical costs  Healthy Mom/Healthy baby  * babies get sick less often, and when they do they are usually sick less severely and for a shorter time  * babies have fewer ear infections  * babies have fewer allergies  *Mothers who breastfeed have a lower risk for cancer, osteoporosis, anemia, high blood pressure, obesity, and Type ll diabetes  *Mothers miss less work days with sick babies  Breast fed babies have a better dental health  * babies have better jaw development which requires lest orthodontic work  *Breast milk does not promote cavities  * babies can nurse at night without worry of tooth decay  Breastfeeding allows a baby to reach his full IQ potential  *The longer a baby is breast fed, the better their brain development  Breast fed babies and moms are more relaxed  *The hormones released during breastfeeding have a calming effect on mothers  *Breastfeeding requires mom to take a break; this may help mom get more rest after delivery  *Breastfeeding is quicker than preparing formula which allows mom and baby to get back to sleep faster  *Breastfeeding promotes bonding and allows mom to learn babies cues and care needs more quickly  Breastfeeding cleanup is easier  *The bowel movements and spit up of breast fed babies doesn't smell as bad  *Spit-up of breast fed babies doesn't stain clothing  Getting out of the hourse is easier  *No formula bottles to prepare and carry safely   *No time restraints due to worry about what baby will eat  *No worries about warming a bottle or finding safe water to prepare bottles  Breastfeeding mother get their bodies back sooner  *The uterus shrinks more quickly and completely, which allows a flatter tummy  *Breastfeeding burns 400-500 calories a day; making milk torches stored fat!  Breastfeeding is better for the environment  *There is no trash to dispose of after breastfeeding  *There is no production facility to produce breast  milk; moms body does it all without the pollution of a factory      Your Guide to Breastfeeding Booklet by Zazzy, [email or web address]      Safe Storage of Breastmilk magnet: [email or web address] Center[facility]urchase

## 2025-05-09 NOTE — PLAN OF CARE
Goal Outcome Evaluation:  Plan of Care Reviewed With: patient           Outcome Evaluation: , Augmentation of SROM on 25 @ 0100 Meconium, 40w3d, GBS+ ( Ancef 2000 mg being administered q 6). SVE at 1850 8-9//-2. Epidural currently infusing. Patient did have pitocin infusing at 10 mu/min but was stopped due to fetal deceleration. Patient is having a baby girl, breast feeding, and using Josiah. VSS. No complaints at this time.

## 2025-05-09 NOTE — PROGRESS NOTES
Marcin Davis MD  [facility]  [address]  [place] [id number]  Office [phone]  Fax [phone]      [facility]  Lee Irizarry  : 1990  MRN: [id number]  CSN: [id number]    Labor progress note    Subjective   She reports is comfortable with the epidural     Objective   Min/max vitals past 24 hours:  Temp  Min: 97.7 °F (36.5 °C)  Max: 99 °F (37.2 °C)   BP  Min: 95/58  Max: 148/86   Pulse  Min: 90  Max: 118   Resp  Min: 14  Max: 20        FHT's: reassuring and category 2.  external monitors used   Cervix: was checked (by RN): 9 cm / 100 % / -1   Contractions: irregular      Assessment   IUP at 40w3d  Active labor     Plan   1.    When patient laid back from placement of epidural fetal heart tones were in the 70s, but recovered to baseline with cessation of Pitocin and maternal positioning.  Suspect that there is some malposition to the fetal head, so we will proceed with maternal positioning now that she is comfortable with the epidural.  Will sign out to laborist, Dr Cifuentes.  Allow labor to continue pending maternal and fetal status  Plan discussed with family and questions answered.  Understanding verbalized.    Marcin Davis MD  2025  19:21 CDT

## 2025-05-09 NOTE — PLAN OF CARE
Goal Outcome Evaluation:  Plan of Care Reviewed With: patient, parent        Progress: improving  Outcome Evaluation: VSS. Alvarenga in place. Ambulating in room with assist. ERAS for pain. Pressure dressing in place, CDI. FFMLU1,scant.

## 2025-05-09 NOTE — PROGRESS NOTES
To room during prolonged deceleration.  RN noted she just increased Pitocin within the last 10 minutes.  Ethan around 110 bpm with return to normal baseline with moderate variability.  Patient reports pain is still pretty well-controlled, does feel some increased discomfort in abdomen but legs are still very numb.  Cervix 6-7/75/-1 with thick anterior lip.  Head well applied.    G1 at 40.4, admitted with PROM, augmented throughout the day.  GBS positive on Ancef  Meconium stained amniotic fluid    Cervix remained unchanged through most the night.  She is having regular contractions every 2 to 3 minutes with Pitocin just increased to 6 milliunits/min now having another prolonged deceleration.  Yesterday highest Pitocin was up to 8 before discontinuing following a prolonged deceleration.  On my exam cervix is 6-7 with a thick anterior lip.  Fetal heart rate showed good scalp stem.  She was first documented at 6 cm yesterday around noon.  Unsure if inaccurate measurements through later the afternoon that was promising versus she is now having increased swelling in her cervix.  I explained current situation including prolonged deceleration, cervical exam, and next steps.  I explained at this point since she has not changed in 6 hours she needs criteria for active phase of arrest and proceeding with  would be appropriate.  Alternatively I offered IUPC placement to better assess the strength of her contractions.  She wished to proceed with  at this time.  Surgical risk discussed.  I contacted Dr. Osborne who will perform the suction.  Ancef/azithromycin preop ordered.

## 2025-05-09 NOTE — LACTATION NOTE
Patient calls out requesting assistance with feeding. Reports unable to collect any EBM with pumping after last feeding. Confirmed mother desires to begin supplementing with DBM. DBM consent signed and placed on infant's chart. Retrieved DBM from NICU, labeled and placed in breastmilk fridge in med room. 5 mls of DBM provided to infant, with mother's permission, this RN fed 4 mls of DBM to infant with some difficulty. Infant reluctant to suck on gloved finger, and after 3 mls, became restless and fussy, intermittent gagging. Achieved 4 ml feeding, infant refused remaining 1 ml.Discussed with patient, infant with signs of possible amniotic fluid presence in stomach from delivery. Educated on signs of distress to monitor for ( excessive swallows unrelated to feeding, sensitive gag reflex, emesis) educated on use of bulb suction of emesis occurs. Encouraged mother to continue with feeding attempts, breastfeed first, if unable to achieve latch or adequate feed at breast, advise mother to pump bilateral breasts for max 20 mins, provide all available EBM to infant and may supplement with DBM as needed. Patient verbalizes understanding. Questions and concerns denied at this time.

## 2025-05-09 NOTE — OP NOTE
.[facility]  Lee Irizarry  : 1990  MRN: [id number]  [facility]: [id number]  Date: 2025    Operative Note        Pre-op Diagnosis:  Pregnancy at 40 weeks 4 days  Arrest of active phase of labor   Post-op Diagnosis:  same   Procedure: Procedure(s):   SECTION PRIMARY   Surgeon: Surgeon(s):  Marcin Davis MD       Anesthesia: Spinal     Estimated Blood Loss: 500   mLs   Fluids: 700   mLs   UOP: 200   mLs   Drains: Alvarenga catheter   ABx: Kefzol, Azithromycin     Specimens:  None   Findings: Normal uterus, tubes, and ovaries.    Complications: None       INDICATION: Lee Irizarry is a 34 y.o. female who presented in spontaneous labor and progressed to 6 to 7 cm dilation with Pitocin augmentation.  Despite Pitocin augmentation and maternal positioning, no further progress was able to be made.     PROCEDURE: After informed consent was obtained, the patient was taken to the operating room where spinal anesthesia was administered. Time out procedure was completed and perioperative antibiotics were administered. She was placed in the supine position with leftward tilt and her abdomen was prepped and draped in normal sterile fashion with an indwelling Alvarenga catheter.   After confirming adequate anesthesia, a Pfannenstiel incision was made with a scalpel 2 fingerbreadths above the pubic symphysis.  This was carried down sharply to the underlying fascia which was incised in the midline.  The fascial incision was extended laterally with Sahni scissors.  The fascial edges were then elevated and the underlying rectus muscles dissected off with sharp technique.  The rectus muscles were sharply  in the midline and the underlying peritoneum identified and entered sharply.  The peritoneal incision was then extended and an Anurag-O retractor inserted, taking care to avoid entrapping the bowel.  A low transverse uterine incision was made with a clean scalpel.  The uterine incision was  bluntly extended and amniotomy was performed returning clear fluid.  The head of the infant was delivered through the incision without difficulty and the remainder of the infant delivered with fundal pressure.  The infant was not vigorous on the field but had good tone, the cord was clamped and cut without delay, and the infant handed off to waiting NICU team.  The placenta was then expressed.  The uterus was repaired in situ and cleared of clot and debris with a moist laparotomy sponge.  The uterine incision was closed with a running locked suture of 0 Monocryl.  A second imbricating layer of 0 Monocryl was placed for reinforcement.  The uterine incision was hemostatic.  The retractor was removed.  The parietal peritoneum was then closed with a running suture of 2-0 Vicryl Rapide.  The subfascial spaces and rectus muscles were inspected with hemostasis noted.  The fascia was then closed with a running suture of 0 Vicryl.  The subcutaneous tissues were irrigated and made hemostatic with Bovie cautery.  The subcutaneous tissues were reapproximated with interrupted sutures of 2-0 Vicryl Rapide.  The skin was closed with a subcuticular stitch of 3-0 Vicryl Rapide and reinforced with Steri-Strips.  A sterile dressing was then applied.    After expressing the uterus the patient was transitioned to the stretcher and taken to the recovery room in stable condition.  She tolerated the procedure well without complications.  All sponge, needle, and instrument counts were correct ×3 per the OR staff.    Marcin Davis MD   5/9/2025  08:28 CDT

## 2025-05-09 NOTE — ANESTHESIA PREPROCEDURE EVALUATION
Anesthesia Evaluation     NPO Solid Status: > 6 hours  NPO Liquid Status: > 4 hours           Airway   Mallampati: II  TM distance: >3 FB  Neck ROM: full  Dental - normal exam     Pulmonary - negative pulmonary ROS   Cardiovascular - negative cardio ROS        Neuro/Psych- negative ROS  GI/Hepatic/Renal/Endo - negative ROS     Musculoskeletal (-) negative ROS    Abdominal    Substance History - negative use     OB/GYN    (+) Pregnant        Other - negative ROS                   Anesthesia Plan    ASA 2     epidural     (Lab Results       Component                Value               Date                       WBC                      12.75 (H)           05/08/2025                 HGB                      11.7 (L)            05/08/2025                 HCT                      35.2                05/08/2025                 MCV                      87.6                05/08/2025                 PLT                      256                 05/08/2025            R and b of epidural explained to pt including bleeding, infection, nerve damage, back pain, pdph and poss GA. Pt verbalizes understanding and wishes to proceed. )      CODE STATUS:    Code Status (Patient has no pulse and is not breathing): CPR (Attempt to Resuscitate)  Medical Interventions (Patient has pulse or is breathing): Full Support       n/a

## 2025-05-09 NOTE — PLAN OF CARE
Goal Outcome Evaluation:  Plan of Care Reviewed With: patient        Progress: improving  Outcome Evaluation: . 40w4d. Augmentation of SROM on 25 at 0100 mec. GBS positive, ancef 2000 mg administered q6h. SVE at 0406 /-1. Epidural running. Pitocin infusing at 4 mu/hr. VSS.

## 2025-05-09 NOTE — ANESTHESIA PROCEDURE NOTES
Labor Epidural      Patient location during procedure: OB  Performed By  CRNA/CAA: Jaziel Jaime CRNA  Preanesthetic Checklist  Completed: patient identified, IV checked, site marked, risks and benefits discussed, surgical consent, monitors and equipment checked, pre-op evaluation and timeout performed  Prep:  Pt Position:sitting  Sterile Tech:cap, gloves, mask and sterile barrier  Prep:chlorhexidine gluconate and isopropyl alcohol  Monitoring:blood pressure monitoring and continuous pulse oximetry  Epidural Block Procedure:  Approach:midline  Guidance:landmark technique  Location:L4-L5  Needle Type:Tuohy  Needle Gauge:18 G  Loss of Resistance Medium: saline  Loss of Resistance: 8cm  Cath Depth at skin:12 cm  Paresthesia: none  Aspiration:negative  Test Dose:negative  Post Assessment:  Dressing:occlusive dressing applied and secured with tape  Pt Tolerance:patient tolerated the procedure well with no apparent complications  Complications:no

## 2025-05-10 LAB
BASOPHILS # BLD AUTO: 0.1 10*3/MM3 (ref 0–0.2)
BASOPHILS NFR BLD AUTO: 0.4 % (ref 0–1.5)
DEPRECATED RDW RBC AUTO: 52.2 FL (ref 37–54)
EOSINOPHIL # BLD AUTO: 0.08 10*3/MM3 (ref 0–0.4)
EOSINOPHIL NFR BLD AUTO: 0.3 % (ref 0.3–6.2)
ERYTHROCYTE [DISTWIDTH] IN BLOOD BY AUTOMATED COUNT: 16.2 % (ref 12.3–15.4)
HCT VFR BLD AUTO: 33.6 % (ref 34–46.6)
HGB BLD-MCNC: 11 G/DL (ref 12–15.9)
IMM GRANULOCYTES # BLD AUTO: 0.38 10*3/MM3 (ref 0–0.05)
IMM GRANULOCYTES NFR BLD AUTO: 1.5 % (ref 0–0.5)
LYMPHOCYTES # BLD AUTO: 1.71 10*3/MM3 (ref 0.7–3.1)
LYMPHOCYTES NFR BLD AUTO: 6.5 % (ref 19.6–45.3)
MCH RBC QN AUTO: 28.9 PG (ref 26.6–33)
MCHC RBC AUTO-ENTMCNC: 32.7 G/DL (ref 31.5–35.7)
MCV RBC AUTO: 88.2 FL (ref 79–97)
MONOCYTES # BLD AUTO: 1.78 10*3/MM3 (ref 0.1–0.9)
MONOCYTES NFR BLD AUTO: 6.8 % (ref 5–12)
NEUTROPHILS NFR BLD AUTO: 22.09 10*3/MM3 (ref 1.7–7)
NEUTROPHILS NFR BLD AUTO: 84.5 % (ref 42.7–76)
NRBC BLD AUTO-RTO: 0 /100 WBC (ref 0–0.2)
PLATELET # BLD AUTO: 231 10*3/MM3 (ref 140–450)
PMV BLD AUTO: 11.5 FL (ref 6–12)
RBC # BLD AUTO: 3.81 10*6/MM3 (ref 3.77–5.28)
WBC NRBC COR # BLD AUTO: 26.14 10*3/MM3 (ref 3.4–10.8)

## 2025-05-10 PROCEDURE — 0503F POSTPARTUM CARE VISIT: CPT | Performed by: OBSTETRICS & GYNECOLOGY

## 2025-05-10 PROCEDURE — 25010000002 KETOROLAC TROMETHAMINE PER 15 MG: Performed by: OBSTETRICS & GYNECOLOGY

## 2025-05-10 PROCEDURE — 85025 COMPLETE CBC W/AUTO DIFF WBC: CPT | Performed by: OBSTETRICS & GYNECOLOGY

## 2025-05-10 RX ORDER — CYCLOBENZAPRINE HCL 10 MG
10 TABLET ORAL 3 TIMES DAILY PRN
Status: DISCONTINUED | OUTPATIENT
Start: 2025-05-10 | End: 2025-05-11 | Stop reason: HOSPADM

## 2025-05-10 RX ADMIN — CYCLOBENZAPRINE HYDROCHLORIDE 10 MG: 10 TABLET, FILM COATED ORAL at 20:06

## 2025-05-10 RX ADMIN — MONTELUKAST SODIUM 10 MG: 10 TABLET, COATED ORAL at 20:06

## 2025-05-10 RX ADMIN — ACETAMINOPHEN 650 MG: 325 TABLET, FILM COATED ORAL at 21:00

## 2025-05-10 RX ADMIN — ACETAMINOPHEN 650 MG: 325 TABLET, FILM COATED ORAL at 09:51

## 2025-05-10 RX ADMIN — PRENATAL VIT W/ FE FUMARATE-FA TAB 27-0.8 MG 1 TABLET: 27-0.8 TAB at 09:51

## 2025-05-10 RX ADMIN — IBUPROFEN 600 MG: 600 TABLET, FILM COATED ORAL at 19:06

## 2025-05-10 RX ADMIN — CYCLOBENZAPRINE HYDROCHLORIDE 10 MG: 10 TABLET, FILM COATED ORAL at 09:53

## 2025-05-10 RX ADMIN — DOCUSATE SODIUM 100 MG: 100 CAPSULE, LIQUID FILLED ORAL at 20:06

## 2025-05-10 RX ADMIN — CETIRIZINE HYDROCHLORIDE 10 MG: 10 TABLET, FILM COATED ORAL at 09:53

## 2025-05-10 RX ADMIN — ACETAMINOPHEN 650 MG: 325 TABLET, FILM COATED ORAL at 16:11

## 2025-05-10 RX ADMIN — KETOROLAC TROMETHAMINE 15 MG: 15 INJECTION, SOLUTION INTRAMUSCULAR; INTRAVENOUS at 05:10

## 2025-05-10 NOTE — PROGRESS NOTES
Chichi Reardon MD  [facility]  [address]  [place] [address]  Office [phone]  Fax [phone]      [facility]   PROGRESS NOTE    Post-Op Day 1 S/P   Subjective     Patient reports:  Pain is well controlled with  ERAS .  She is ambulating. Tolerating diet. Voiding - without difficulty; flatus reported..  Vaginal bleeding is as much as expected.      Objective      Vitals: Vital Signs Range for the last 24 hours  Temperature: Temp:  [97.3 °F (36.3 °C)-99 °F (37.2 °C)] 97.9 °F (36.6 °C)   Temp Source: Temp src: Temporal   BP: BP: (125-152)/() 126/68   Pulse: Heart Rate:  [] 90   Respirations: Resp:  [16-18] 18   SPO2: SpO2:  [96 %-99 %] 99 %   O2 Amount (l/min):     O2 Devices Device (Oxygen Therapy): room air   Weight:              Physical Exam    Lungs Non-labored, symmetrical chest rise   Abdomen Soft, no TTP, no distension   Incision  no drainage, no erythema   Extremities extremities normal, atraumatic, no cyanosis or edema     I reviewed the patient's new clinical results.  Lab Results (last 24 hours)       Procedure Component Value Units Date/Time    CBC & Differential [[id number]]  (Abnormal) Collected: 05/10/25 0832    Specimen: Blood Updated: 05/10/25 0844    Narrative:      The following orders were created for panel order CBC & Differential.  Procedure                               Abnormality         Status                     ---------                               -----------         ------                     CBC Auto Differential[[id number]]        Abnormal            Final result                 Please view results for these tests on the individual orders.    CBC Auto Differential [[id number]]  (Abnormal) Collected: 05/10/25 0832    Specimen: Blood Updated: 05/10/25 0844     WBC 26.14 10*3/mm3      RBC 3.81 10*6/mm3      Hemoglobin 11.0 g/dL      Hematocrit 33.6 %      MCV 88.2 fL      MCH 28.9 pg      MCHC 32.7 g/dL      RDW 16.2 %       RDW-SD 52.2 fl      MPV 11.5 fL      Platelets 231 10*3/mm3      Neutrophil % 84.5 %      Lymphocyte % 6.5 %      Monocyte % 6.8 %      Eosinophil % 0.3 %      Basophil % 0.4 %      Immature Grans % 1.5 %      Neutrophils, Absolute 22.09 10*3/mm3      Lymphocytes, Absolute 1.71 10*3/mm3      Monocytes, Absolute 1.78 10*3/mm3      Eosinophils, Absolute 0.08 10*3/mm3      Basophils, Absolute 0.10 10*3/mm3      Immature Grans, Absolute 0.38 10*3/mm3      nRBC 0.0 /100 WBC             Prenatal Labs  Lab Results   Component Value Date    HGB 11.0 (L) 05/10/2025    RUBELLAABIGG Reactive 10/17/2024    HEPBSAG Non-Reactive 10/17/2024    ABORH A POS 10/17/2024    ABSCRN Negative 05/08/2025    ESQ3IZP4 Non-Reactive 07/01/2024    GCT 88 02/13/2025    STREPGPB Positive (A) 04/10/2025    URINECX <25,000 CFU/mL Mixed Ana Isolated 10/20/2024    CHLAMNAA Negative 04/10/2025    NGONORRHON Negative 04/10/2025       Immunizations:   Immunization History   Administered Date(s) Administered    DTaP, Unspecified 11/26/1996    Fluzone  >6mos 11/11/2024    MMR 11/26/1996    OPV 11/26/1996    Tdap 08/18/2008, 03/27/2025     Lab Results (last 24 hours)       Procedure Component Value Units Date/Time    CBC & Differential [[id number]]  (Abnormal) Collected: 05/10/25 0832    Specimen: Blood Updated: 05/10/25 0844    Narrative:      The following orders were created for panel order CBC & Differential.  Procedure                               Abnormality         Status                     ---------                               -----------         ------                     CBC Auto Differential[[id number]]        Abnormal            Final result                 Please view results for these tests on the individual orders.    CBC Auto Differential [[id number]]  (Abnormal) Collected: 05/10/25 0832    Specimen: Blood Updated: 05/10/25 0844     WBC 26.14 10*3/mm3      RBC 3.81 10*6/mm3      Hemoglobin 11.0 g/dL      Hematocrit 33.6 %      MCV 88.2  fL      MCH 28.9 pg      MCHC 32.7 g/dL      RDW 16.2 %      RDW-SD 52.2 fl      MPV 11.5 fL      Platelets 231 10*3/mm3      Neutrophil % 84.5 %      Lymphocyte % 6.5 %      Monocyte % 6.8 %      Eosinophil % 0.3 %      Basophil % 0.4 %      Immature Grans % 1.5 %      Neutrophils, Absolute 22.09 10*3/mm3      Lymphocytes, Absolute 1.71 10*3/mm3      Monocytes, Absolute 1.78 10*3/mm3      Eosinophils, Absolute 0.08 10*3/mm3      Basophils, Absolute 0.10 10*3/mm3      Immature Grans, Absolute 0.38 10*3/mm3      nRBC 0.0 /100 WBC             CBC          2025    10:26 2025    02:22 5/10/2025    08:32   CBC   WBC  12.75  26.14    RBC  4.02  3.81    Hemoglobin 10.7  11.7  11.0    Hematocrit  35.2  33.6    MCV  87.6  88.2    MCH  29.1  28.9    MCHC  33.2  32.7    RDW  16.2  16.2    Platelets  256  231          Assessment & Plan        Arrested active phase of labor    Pregnancy        Assessment:    Riaangel Irizarry is Day 1  post-partum  , Low Transverse  .       Plan:  remove dressing, continue post op care, and discontinue IV. Pt with soreness from labor and c/s.  Will add flexeril to po regimen .        Chichi Reardon MD  5/10/2025  09:15 CDT

## 2025-05-10 NOTE — LACTATION NOTE
Mother's Name: Lee  Phone #: [phone]  Infant Name: Alyson  : 2025 at 0745  Gestation: 40w4d  Day of life: 1  Birth weight:  7-2.3 (3240g)  Discharge weight:  Weight Loss: -1.39%  24 hour Summary of Feeds: 3 BF 2 attempts 14 ml DBM Voids: 3 Stools: 5  Assistive devices (shields, shells, etc):  Significant Maternal history: , seasonal allergies   Maternal Concerns: latching  Maternal Goal: breastfeed   Mother's Medications: zyrtec, iron, flonase, singulair, PNV  Breastpump for home: no, Rx sent to [facility] 5/10/2025   Ped follow up appt: Dr. Loera    Follow up with mother to discuss breastfeeding progress. Mother's RN reports infant  at 0600 and then at 1100 infant had not fed yet and mother's RN gave infant DBM via syringe. Mother reports that she has not pumped today. Educated mother that she needs to breastfeed or pump at least every 3 hours to protect milk supply, and to pump especially if supplement is given. Mother verbalized understanding. Infant due to eat at this time and had a bath prior to being brought back to room. Infant rooting and smacking lips. With permission infant undressed down to diaper and brought to mother in football hold at the right breast. Mother attempted to latch infant without success and states that she feels like she cannot get into a good position while in the bed. Offered to assist mother to recliner at bedside. Mother ambulated to recliner and assisted with positioning pillows for support. Infant brought to mother's right breast in football hold. With permission, attempted to hand express. Unable to express any drops of colostrum at this time, mother reported tenderness with hand expression and states that she has not attempted to do this on her own yet. Instructed mother how to hand express. Mother states that her right arm is uncomfortable in this position. Assisted mother with repositioning infant in cross cradle hold on right breast. Infant not  showing cues at this time and is asleep. With permission baby sit ups were performed, infant began to root and smack lips. Mother requesting to try to breastfeed on left breast in football hold. Assisted with repositioning infant at left breast in football hold. Mother unable to latch infant in this position due to infant being sleepy. Infant then placed skin to skin on mother's chest. Discussed plan for feeding with mother. Mother to pump now and give infant available colostrum and then to give DBM if unable to collect enough colostrum. After 15 minutes of pumping mother had collected drops of colostrum, instructed to finger feed to infant. Mother had applied to nipples. 5 ml of DBM given in syringe, instructed to syringe feed. Instructed mother that 5 ml is minimal feeding and to not withhold if infant wants more. Mother verbalized understanding. Paced bottle feeding handout provided and reviewed incase mother starts to use bottle in the next couple of days. Mother verbalized understanding.

## 2025-05-10 NOTE — PLAN OF CARE
Goal Outcome Evaluation:  Plan of Care Reviewed With: patient        Progress: improving  Outcome Evaluation: VSS FFU1ML, scant lochia. No clots, No odor. ERAS for pain today. Dressing removed. Pt showered. Pt is breast feeding and needs alot of help and encouragment.

## 2025-05-11 VITALS
RESPIRATION RATE: 18 BRPM | SYSTOLIC BLOOD PRESSURE: 133 MMHG | BODY MASS INDEX: 32.47 KG/M2 | WEIGHT: 202 LBS | OXYGEN SATURATION: 97 % | DIASTOLIC BLOOD PRESSURE: 89 MMHG | TEMPERATURE: 98.3 F | HEIGHT: 66 IN | HEART RATE: 95 BPM

## 2025-05-11 PROCEDURE — 0503F POSTPARTUM CARE VISIT: CPT | Performed by: OBSTETRICS & GYNECOLOGY

## 2025-05-11 RX ORDER — OXYCODONE AND ACETAMINOPHEN 5; 325 MG/1; MG/1
1 TABLET ORAL EVERY 6 HOURS PRN
Qty: 12 TABLET | Refills: 0 | Status: SHIPPED | OUTPATIENT
Start: 2025-05-11

## 2025-05-11 RX ORDER — CYCLOBENZAPRINE HCL 10 MG
10 TABLET ORAL EVERY 8 HOURS PRN
Qty: 15 TABLET | Refills: 0 | Status: SHIPPED | OUTPATIENT
Start: 2025-05-11 | End: 2026-05-11

## 2025-05-11 RX ADMIN — PRENATAL VIT W/ FE FUMARATE-FA TAB 27-0.8 MG 1 TABLET: 27-0.8 TAB at 08:02

## 2025-05-11 RX ADMIN — CETIRIZINE HYDROCHLORIDE 10 MG: 10 TABLET, FILM COATED ORAL at 08:02

## 2025-05-11 RX ADMIN — DOCUSATE SODIUM 100 MG: 100 CAPSULE, LIQUID FILLED ORAL at 08:02

## 2025-05-11 RX ADMIN — ACETAMINOPHEN 650 MG: 325 TABLET, FILM COATED ORAL at 10:43

## 2025-05-11 RX ADMIN — ACETAMINOPHEN 650 MG: 325 TABLET, FILM COATED ORAL at 03:25

## 2025-05-11 RX ADMIN — IBUPROFEN 600 MG: 600 TABLET, FILM COATED ORAL at 13:09

## 2025-05-11 RX ADMIN — IBUPROFEN 600 MG: 600 TABLET, FILM COATED ORAL at 06:00

## 2025-05-11 NOTE — DISCHARGE SUMMARY
[facility]    Chichi Reardon MD  [address]  [place] [address]  [phone]      Discharge Summary      Lee Irizarry  : 1990  MRN: [id number]  CSN: [id number]    Date of Admission: 2025   Date of Discharge:  2025   Delivering Physician: Marcin Davis       Admission Diagnosis: Pregnancy [Z34.90]  Arrest of dilation     Discharge Diagnosis: Pregnancy at 40w4d - delivered       Procedures: 2025 - , Low Transverse      Presenting Problem/History of Present Illness  Active Hospital Problems    Diagnosis  POA    **Arrested active phase of labor [O62.1]  No    Pregnancy [Z34.90]  Not Applicable      Resolved Hospital Problems   No resolved problems to display.        Hospital Course  Patient is a 34 y.o.  who at 40w4d had a  section. See the completed operative report for details regarding antepartum course and delivery. Her post-operative course was unremarkable.  On POD # 2 she felt like she ready for discharge.  She was evaluated by Dr. Reardon who agreed she was able to be discharged to home.  She had no febrile morbidity. She had normal bowel and bladder function and was hemodynamically stable.  Her wound was healing well without obvious signs of infections.    Infant  female fetus weighing 3240 g (7 lb 2.3 oz)  Apgars -  8 @ 1 minute /  9 @ 5 minutes.    Procedures Performed    Procedure(s):   SECTION PRIMARY  -------------------       Consults:   Consults       No orders found from 2025 to 2025.            Pertinent Test Results:   CBC          2025    10:26 2025    02:22 5/10/2025    08:32   CBC   WBC  12.75  26.14    RBC  4.02  3.81    Hemoglobin 10.7  11.7  11.0    Hematocrit  35.2  33.6    MCV  87.6  88.2    MCH  29.1  28.9    MCHC  33.2  32.7    RDW  16.2  16.2    Platelets  256  231        Condition on Discharge:  Stable    Vital Signs  Temp:  [97.8 °F (36.6 °C)-98.5 °F (36.9 °C)]  98.3 °F (36.8 °C)  Heart Rate:  [82-98] 95  Resp:  [18] 18  BP: (119-133)/(69-89) 133/89    Physical Exam:   No exam performed today,    Discharge Disposition  Home or Self Care    Discharge Medications     Discharge Medications        New Medications        Instructions Start Date   cyclobenzaprine 10 MG tablet  Commonly known as: FLEXERIL   10 mg, Oral, Every 8 Hours PRN      oxyCODONE-acetaminophen 5-325 MG per tablet  Commonly known as: Percocet   1 tablet, Oral, Every 6 Hours PRN             Continue These Medications        Instructions Start Date   cetirizine 10 MG tablet  Commonly known as: zyrTEC   1 tablet, Daily      ferrous gluconate 324 MG tablet  Commonly known as: FERGON   324 mg, Daily      fluticasone 50 MCG/ACT nasal spray  Commonly known as: FLONASE   2 sprays, Daily      montelukast 10 MG tablet  Commonly known as: SINGULAIR   1 tablet, Daily      Prenatal 19 chewable tablet   1 tablet, Oral, Daily               Discharge Diet:  Home diet    Activity at Discharge: , pelvic rest, no lifting greater than baby's weight    Follow-up Appointments  Future Appointments   Date Time Provider Department Center   2025 10:45 AM Marcin Davis MD [facility]   2025  1:15 PM Marcin Davis MD [facility]       Follow-up for postpartum visit/ incision check in 2 weeks with Dr. Davis.    Test Results Pending at Discharge       Chichi Reardon MD  25  08:35 CDT    Time: Discharge <30 min

## 2025-05-11 NOTE — PLAN OF CARE
Goal Outcome Evaluation:              Outcome Evaluation: VSS. FFMLU1. scant lochia. incicsion dry and intact. dried drainage on middle of incision. ERAS for pain but requested to not be woken up for 1am and 4am dose. flexeril PRN given. formula feeding this shift and encouraged to pump if she desires to take baby back to breast. resting in between care.

## 2025-05-11 NOTE — CASE MANAGEMENT/SOCIAL WORK
Pt stating she will be off work with  and asking about resource information.  Providing pt community resource information for county of residence for her to take home with her.

## 2025-05-12 ENCOUNTER — MATERNAL SCREENING (OUTPATIENT)
Dept: CALL CENTER | Facility: HOSPITAL | Age: 35
End: 2025-05-12
Payer: COMMERCIAL

## 2025-05-12 NOTE — OUTREACH NOTE
Maternal Screening Survey      Flowsheet Row Responses   Eligibility Eligible   Prep survey completed? Yes   Facility patient discharged from? [facility] - Registered [facility]

## 2025-05-13 ENCOUNTER — HOSPITAL ENCOUNTER (OUTPATIENT)
Dept: LACTATION | Facility: HOSPITAL | Age: 35
Discharge: HOME | End: 2025-05-13
Payer: COMMERCIAL

## 2025-05-13 ENCOUNTER — TELEPHONE (OUTPATIENT)
Dept: OBSTETRICS AND GYNECOLOGY | Age: 35
End: 2025-05-13
Payer: COMMERCIAL

## 2025-05-13 NOTE — LACTATION NOTE
"Mother's Name:  Lee Irizarry--Pt took prenatal bfing class as well as had one-on-one prenatal bfing education appmt.  G/P:    1/1  Breastfeeding Hx:  NA  Maternal Breast Assessment:  bilateral significant engorgement, bilateral flat nipples r/t engorgement, warmth noted, mother very uncomfortale    Infant's Name:  Alyson (\"Andrei duran\") Juan C  Date of Birth:   [birth year]  Gestational age at Birth: 40-4  Age:    4 days  Physician:   Mary Loera DO                     Reason for Visit:  Impromptu, at Ped visit w infant showing hunger cues, needs help w latching, infant is formula fed, pt desires bfing.      Parents seem unaware of hunger cues. Pt voiced has pump at home. Offered to assist with bfing. Pt agreeable.           Infant's Birth weight:  7-2.3 3240g  Discharge Weight:  6-15.1 3150g  Wt Loss: 1.3%    Today's Weight:   7-7.2 3378g   Well past birth wt at 4 days; formula feeding.    Feeding History Since Discharge/Last Lactation Appt.:Pt with difficulty latching in hospital. Has been exclusively formula feeding. Says pumped for first time last night collecting 15 mls. Pt's original desire - and current stated desire- is bfing.     Time at Breast         Right Breast:  20 mins +14 mls  Left Breast:   20 mins  +20 mls                        Total Minutes:      40       Total Weight Gain:        34  mls or just over 1 oz  (First time at breast)    Average Feeding Amount for Age: 30-45 mls or 1 - 1 1/2 ounces each feed, every 2-3 hours, or 8-12 times in 24 hours.     Interventions: Maximum assistance given for latching infant in cross cradle hold deeply with nipple shield. (20mm). Formula enticement per bottle dripping at breast used with good result. Alyson began sucking/swallowing with only 2-3 \"squirts\" needed. Once pt began compressing engorged breast, infant swallowed continually and remained content at breast. FOB, Fox, provided breast compressions underside of breast which prompted much gulping. Parents able " "to discern swallows well. Much praise given. Urged pt to cont bfing instead of bottle feeding to help infant learn to bf well and to aid emptying of engorged breast. Emphasized bfing skin to skin every 2-3 hours, using Haakaa during bfing sessions. Applied Haaka with 45 mls collected during session. Bilateral breasts softer after consult, but still required more emptying. Urged pt to pump w double electric pump upon return home to avoid further engorgement or risk mastitis. Pt has Motif Becky.    Education: positioning, latching, enticing to breast, compressions, waking, swallowing triggers, how to wean from shield when bfing well-established    Notified MD/ Orders Received: Dr. Loera referral for today's consult.     Feeding Plan:   Bf at first hunger cues going no longer than 3 hours between feeds.   Bf skin to skin  getting super deep latch in cross cradle hold with nipple shield.   Using EBM/formula \"squirted\" onto breast while infant latched for enticing infant to bf.  Compress breast entire time Alyson is there to keep her feeding/ swallowing.  Use Haakaa on opposite breast while bfing.   Pump after bfing for comfort. Store this milk.  Avoid bottle feeding until after one month if possible.  Pump for 20 mins anytime a bottle is given to keep milk supply high and avoid engorgement.   Watch \"Attaching Baby to the Breast\" [employer], 10 mins, [employer]    Plan of Care:  Interventions require further assessment with [facility] Lactation    Future Appointments:  Lactation: Friday, 11 am, 5/16/25;  \[facility] [place] [facility]\"" location; [place] [facility] lab at his location first  Physician: Dr. Loera as ordered  Signature: MILES Klein  Date:  5/13/25     "

## 2025-05-13 NOTE — TELEPHONE ENCOUNTER
Pt calling to report swelling of legs and feet post delivery that is causing pain of 9/10 in lower extremities. Pt denies any HA, epigastric pain, visual disturbances, discoloration of extremities, or SOB at this time. Pt reports she has taken ibuprofen and flexeril but has not taken percocet for pain. Spoke with Dr. Davis and he advises pt will have swelling in postpartum period and is likely at peak level of swelling. He recommends pt be up and moving frequently, elevate legs while resting, drinking plenty of fluids and using ace bandage wrap to help with swelling of extremities and advises pt return call if not experiencing relief in next couple of days. Pt informed of recommendations and advised to return call or go to ER with worsening symptoms. Pt voices understanding.

## 2025-05-16 ENCOUNTER — HOSPITAL ENCOUNTER (OUTPATIENT)
Dept: LACTATION | Facility: HOSPITAL | Age: 35
Discharge: HOME | End: 2025-05-16
Payer: COMMERCIAL

## 2025-05-16 NOTE — LACTATION NOTE
"Pt did not present for scheduled OP visit below, nor did she call and cancel. I called pt regarding missed appmt. The phone consult is as follows:  Pt reports is bfing infant, then pumping as needed for comfort. Pt with many questions about proper storage times for EBM. Pt says breasts are staying softer than they were at last OP visit then pt was very engorged. Pt is using nipple shield. Says some bf's are challenging. Urged paced horizontal method if bottles are given, [place] use, and pumping as she is to avoid engorgement. Further, bfing, every 3 hours or less offering breast at every feeding cue. Pt says will call if has other needs.       Mother's Name:                       Lee Irizarry--Pt took prenatal bfing class as well as had one-on-one prenatal bfing education appmt.      Was seen as  Lactation OP 3 days ago while here for Ped visit. Was exclusively formula feeding at that time.  G/P:                                         1/1  Breastfeeding Hx:                   NA  Maternal Breast Assessment:  bilateral significant engorgement, bilateral flat nipples r/t engorgement, warmth noted, mother very uncomfortale  Support Person:  Fox Del Castillo     Infant's Name:                       Alyson (\"Andrei duran\") Irizarry (f)  Date of Birth:                           [birth year]  Gestational age at Birth:         40-4  Age:                                         1 week  Physician:                                Mary Loera DO                                      Infant's Birth weight:                7-2.3 3240g  Discharge Weight:                  6-15.1 3150g              Wt Loss: 1.3%   Last Wt   7-7.2 3378g  Well past birth wt at 4 days; formula feeding.     Today's Weight:        Pt did not come. No weight attained.              Signature:        KAITLYNN KleinCLC  Date:                5/16/25  "

## 2025-05-20 ENCOUNTER — MATERNAL SCREENING (OUTPATIENT)
Dept: CALL CENTER | Facility: HOSPITAL | Age: 35
End: 2025-05-20
Payer: COMMERCIAL

## 2025-05-20 NOTE — OUTREACH NOTE
Maternal Screening Survey      Flowsheet Row Responses   Facility patient discharged from? [facility]   Attempt successful? No   Unsuccessful attempts Attempt 2              JACEY SANCHEZ - Registered Nurse

## 2025-05-20 NOTE — OUTREACH NOTE
Maternal Screening Survey      Flowsheet Row Responses   Facility patient discharged from? [facility]   Attempt successful? No   Unsuccessful attempts Attempt 1              JACEY SANCHEZ - Registered Nurse

## 2025-05-21 ENCOUNTER — MATERNAL SCREENING (OUTPATIENT)
Dept: CALL CENTER | Facility: HOSPITAL | Age: 35
End: 2025-05-21
Payer: COMMERCIAL

## 2025-05-21 NOTE — OUTREACH NOTE
Maternal Screening Survey      Flowsheet Row Responses   Facility patient discharged from? [facility]   Attempt successful? No   Unsuccessful attempts Attempt 3   Revoke Unable to reach              Luna MARCH - Registered Nurse              Lnua MARCH - Registered Nurse

## 2025-05-22 ENCOUNTER — POSTPARTUM VISIT (OUTPATIENT)
Age: 35
End: 2025-05-22
Payer: COMMERCIAL

## 2025-05-22 VITALS
SYSTOLIC BLOOD PRESSURE: 142 MMHG | DIASTOLIC BLOOD PRESSURE: 82 MMHG | BODY MASS INDEX: 30.22 KG/M2 | HEIGHT: 66 IN | WEIGHT: 188 LBS

## 2025-05-22 DIAGNOSIS — Z09 POSTOP CHECK: Primary | ICD-10-CM

## 2025-05-22 PROCEDURE — 99024 POSTOP FOLLOW-UP VISIT: CPT | Performed by: OBSTETRICS & GYNECOLOGY

## 2025-05-22 NOTE — PROGRESS NOTES
"Lee Irizarry is a 34 y.o. female here today for incision check after undergoing  on .  She has been doing well since her discharge from the hospital and denies fevers, significant abdominal pain, nausea, or problems with her incision.  Her infant is breast-feeding well and she denies postpartum blues.    /82 (BP Location: Right arm, Patient Position: Sitting)   Ht 167.6 cm (66\")   Wt 85.3 kg (188 lb)   Breastfeeding Yes   BMI 30.34 kg/m²   In general pleasant female no acute distress  Abdomen soft and nontender  Her surgical taping is removed and her incision is healing well without signs of infection    Assessment: Normal incision check after a     She will continue with light activities and pelvic rest.  She will followup in 4 weeks for a postpartum visit and call in the meantime if she has any questions or concerns.   "

## 2025-05-29 ENCOUNTER — POSTPARTUM VISIT (OUTPATIENT)
Age: 35
End: 2025-05-29
Payer: COMMERCIAL

## 2025-05-29 VITALS
WEIGHT: 180 LBS | DIASTOLIC BLOOD PRESSURE: 70 MMHG | SYSTOLIC BLOOD PRESSURE: 122 MMHG | HEIGHT: 66 IN | BODY MASS INDEX: 28.93 KG/M2

## 2025-05-29 DIAGNOSIS — N89.8 VAGINAL DISCHARGE: ICD-10-CM

## 2025-05-29 DIAGNOSIS — N76.1 SUBACUTE VAGINITIS: Primary | ICD-10-CM

## 2025-05-29 NOTE — PROGRESS NOTES
"Chief Complaint  Gynecologic Exam (Pt is here with c/o vaginal discharge.  +GBS bacteria and yeast 4/10/25 )  History of Present Illness  The patient presents for vaginal discharge.    Her infant's sleep pattern is irregular, with daytime sleepiness and nighttime wakefulness, which has resulted in her own sleep deprivation. She has not had a full 4 hours of sleep since before delivery. Despite residing with her grandparents, she struggles to find time for rest due to their advanced age and inability to assist with childcare. She attempts to nap when her infant sleeps. She continues to take prenatal vitamins and acknowledges a change in her appetite post-pregnancy. She has been unable to drive for the past few weeks, limiting her ability to procure food independently. She plans to resume a regular meal schedule upon returning home. She plans to take an time off from work and return back in August. She hopes that this time will help get into a routine before returning to work.     She first noticed abnormal vaginal discharge at the end of 2025. A swab test conducted by Dr. Jones in 2025 revealed a yeast infection, for which she was prescribed a vaginal cream. However, she was unable to complete the 7-day course due. She is uncertain if her current symptoms are indicative of a yeast infection or another condition. She reports cessation of vaginal bleeding as of last Friday.    OBSTETRICAL HISTORY: Obstetrics History discussed   1, Para 1    PAST SURGICAL HISTORY:     Subjective          Lee Irizarry presents to [facility]  History of Present Illness    Review of Systems   Genitourinary:  Positive for vaginal discharge.   Psychiatric/Behavioral:  Positive for sleep disturbance (r/t baby's sleep pattern.). Negative for self-injury and suicidal ideas.          Objective   Vital Signs:   /70   Ht 167.6 cm (66\")   Wt 81.6 kg (180 lb)   BMI 29.05 kg/m²   "   Physical Exam  Vitals reviewed. Exam conducted with a chaperone present.   Constitutional:       Appearance: She is well-developed.   Eyes:      General:         Right eye: No discharge.         Left eye: No discharge.   Cardiovascular:      Rate and Rhythm: Normal rate and regular rhythm.   Pulmonary:      Effort: Pulmonary effort is normal.      Breath sounds: Normal breath sounds.   Genitourinary:     Exam position: Lithotomy position.      Labia:         Right: No rash, tenderness, lesion or injury.         Left: No rash, tenderness, lesion or injury.       Vagina: No signs of injury and foreign body. Vaginal discharge (thin white) and tenderness present. No erythema.      Cervix: Normal.      Comments: Gentle speculum exam with pediatric speculum.   Skin:     General: Skin is warm.   Neurological:      Mental Status: She is alert and oriented to person, place, and time.   Psychiatric:         Behavior: Behavior normal.         Thought Content: Thought content normal.         Judgment: Judgment normal.         Result Review :   The following data was reviewed by: JESSICA Flores on 05/29/2025:               Current Outpatient Medications on File Prior to Visit   Medication Sig    cetirizine (zyrTEC) 10 MG tablet Take 1 tablet by mouth Daily.    cyclobenzaprine (FLEXERIL) 10 MG tablet Take 1 tablet by mouth Every 8 (Eight) Hours As Needed for Muscle Spasms.    ferrous gluconate (FERGON) 324 MG tablet Take 1 tablet by mouth Daily.    fluticasone (FLONASE) 50 MCG/ACT nasal spray Administer 2 sprays into the nostril(s) as directed by provider Daily.    montelukast (SINGULAIR) 10 MG tablet Take 1 tablet by mouth Daily.    Prenatal Vit-Fe Fumarate-FA (Prenatal 19) chewable tablet Chew 1 tablet Daily.    oxyCODONE-acetaminophen (Percocet) 5-325 MG per tablet Take 1 tablet by mouth Every 6 (Six) Hours As Needed for Moderate Pain. (Patient not taking: Reported on 5/29/2025)     No current  facility-administered medications on file prior to visit.            Assessment & Plan  1. Postpartum care.  Experiencing fatigue due to inadequate sleep, managing only 1 to 2 hours intermittently. Diet has been inconsistent since childbirth, and supplementing breastfeeding with formula. Advised to seek assistance with infant care to allow  for consecutive hours of sleep. Continuation of prenatal vitamins recommended. Encouraged to maintain hydration and ensure adequate protein intake. Encouraged maintaining contact with lactation consultant as needed.     2. Vaginal discharge.  Experiencing vaginal discharge. A swab test conducted today to determine if there is bacteria or yeast increasing the discharge.. Results will be communicated upon receipt.    Diagnoses and all orders for this visit:    1. Subacute vaginitis (Primary)  -     Genital Mycoplasmas MARTHA, Swab - Swab, Vagina  -     NuSwab VG+ - Swab, Vagina    2. Postpartum state          BMI is >= 30 and <35. (Class 1 Obesity). The following options were offered after discussion;: information on healthy weight added to patient's after visit summary        Follow Up   No follow-ups on file.    Patient was given instructions and counseling regarding her condition or for health maintenance advice. Please see specific information pulled into the AVS if appropriate.       Patient or patient representative verbalized consent for the use of Ambient Listening during the visit with  JESSICA Flores for chart documentation. 5/29/2025  19:07 CDT

## 2025-05-29 NOTE — PATIENT INSTRUCTIONS

## 2025-05-30 NOTE — ANESTHESIA POSTPROCEDURE EVALUATION
"Patient: Lee Irizarry    Procedure Summary       Date: 25 Room / Location: [facility] LABOR DELIVERY 2 /  [facility] LABOR DELIVERY    Anesthesia Start:  Anesthesia Stop: 25    Procedure:  SECTION PRIMARY (Abdomen) Diagnosis:     Surgeons: Marcin Davis MD Provider: Jaziel Jaime CRNA    Anesthesia Type: epidural ASA Status: 2            Anesthesia Type: epidural    Vitals  Vitals Value Taken Time   /70 25 10:23   Temp 98.3 °F (36.8 °C) 25 07:30   Pulse 95 25 07:30   Resp 18 25 07:30   SpO2 97 % 25 07:30           Post Anesthesia Care and Evaluation    Patient location during evaluation: PHASE II  Patient participation: complete - patient participated  Level of consciousness: awake and awake and alert  Pain score: 0  Pain management: adequate    Airway patency: patent  Anesthetic complications: No anesthetic complications  PONV Status: none  Cardiovascular status: acceptable  Respiratory status: acceptable  Hydration status: acceptable  Post Neuraxial Block status: Motor and sensory function returned to baseline and No signs or symptoms of PDPH  Comments: Patient discharged according to acceptable Chalo score per RN assessment. See nursing records for further information.     Blood pressure 133/89, pulse 95, temperature 98.3 °F (36.8 °C), temperature source Oral, resp. rate 18, height 167.6 cm (66\"), weight 91.6 kg (202 lb), SpO2 97%, currently breastfeeding.      "

## 2025-06-05 LAB
A VAGINAE DNA VAG QL NAA+PROBE: NORMAL SCORE
BVAB2 DNA VAG QL NAA+PROBE: NORMAL SCORE
C ALBICANS DNA VAG QL NAA+PROBE: NEGATIVE
C GLABRATA DNA VAG QL NAA+PROBE: NEGATIVE
C TRACH DNA SPEC QL NAA+PROBE: NEGATIVE
M GENITALIUM DNA SPEC QL NAA+PROBE: NORMAL
M HOMINIS DNA SPEC QL NAA+PROBE: NEGATIVE
MEGA1 DNA VAG QL NAA+PROBE: NORMAL SCORE
N GONORRHOEA DNA VAG QL NAA+PROBE: NEGATIVE
T VAGINALIS DNA VAG QL NAA+PROBE: NEGATIVE
UREAPLASMA DNA SPEC QL NAA+PROBE: NEGATIVE

## 2025-06-06 ENCOUNTER — TELEPHONE (OUTPATIENT)
Dept: OBSTETRICS AND GYNECOLOGY | Age: 35
End: 2025-06-06
Payer: COMMERCIAL

## 2025-06-06 NOTE — TELEPHONE ENCOUNTER
Pt called the office wanting test results from [facility]. I explained to pt that once you review them we will let her know. Pt states that she is extremely uncomfortable and needs something sent in  before the weekend.

## 2025-06-09 ENCOUNTER — TELEPHONE (OUTPATIENT)
Dept: OBSTETRICS AND GYNECOLOGY | Age: 35
End: 2025-06-09
Payer: COMMERCIAL

## 2025-06-09 NOTE — TELEPHONE ENCOUNTER
Hub staff attempted to follow warm transfer process and was unsuccessful     Caller: Lee Irizarry    Relationship to patient: Self    Best call back number: [phone]    Patient is needing: A CALL BACK ABOUT MOST RECENT LAB RESULTS. SHE GOT A [employer] MESSAGE BUT HAS FURTHER QUESTIONS

## 2025-06-19 ENCOUNTER — POSTPARTUM VISIT (OUTPATIENT)
Age: 35
End: 2025-06-19
Payer: COMMERCIAL

## 2025-06-19 VITALS
BODY MASS INDEX: 28.93 KG/M2 | WEIGHT: 180 LBS | SYSTOLIC BLOOD PRESSURE: 148 MMHG | DIASTOLIC BLOOD PRESSURE: 102 MMHG | HEIGHT: 66 IN

## 2025-06-19 DIAGNOSIS — M62.838 MUSCLE SPASM: ICD-10-CM

## 2025-06-19 DIAGNOSIS — R20.0 NUMBNESS AND TINGLING OF HAND: ICD-10-CM

## 2025-06-19 DIAGNOSIS — R20.2 NUMBNESS AND TINGLING OF HAND: ICD-10-CM

## 2025-06-19 DIAGNOSIS — R03.0 ELEVATED BLOOD PRESSURE READING: ICD-10-CM

## 2025-06-19 RX ORDER — CYCLOBENZAPRINE HCL 10 MG
10 TABLET ORAL EVERY 8 HOURS PRN
Qty: 15 TABLET | Refills: 0 | Status: SHIPPED | OUTPATIENT
Start: 2025-06-19 | End: 2026-06-19

## 2025-06-19 NOTE — PROGRESS NOTES
"Subjective   Chief Complaint   Patient presents with    Postpartum Care     Pt is here for a postpartum f/u.  on 25.  Pt has no complaints today. Wanting to see on a refill on the flexaril as she is having some muscle spasms still.       Lee Irizarry is a 34 y.o. year old  presenting to be seen for her postpartum visit.  She had a primary  on 25, with Dr. Davis.   Prenatal course was been benign.    Feels like she may be starting her period.   Still having muscle spasms and soreness occasionally.     Pt moved from [place], will request records for WWEs prior to this pregnancy. Pt reports that she thinks her last pap smear was 1-2 years ago, and that she has had a history of a colposcopy.     BPs elevated on both initial exam and with recheck (142/98, 148/102). Given that she reports no prior issues with blood pressure, I will put in a referral to primary care. The patient has not established care with a local PCP since moving from [place] prior to pregnancy - would like a female provider.   Having some tingling in her fingers on her dominant hand occasionally. Discussed wrist braces for sleep. Will follow up with PCP if worsens.     Since delivery she has not been sexually active. She states that her partner is traveling for work and she she is abstinent. Does not desire birth control at this time. Options discussed. Will use condoms for now when partner returns.     She does not have concerns about post-partum blues/depression. States that she has a good familial support system.   She is both breast and bottle feeding due to decreased milk supply.  She denies fevers, nausea, or severe abdominal pain.     Social History    Tobacco Use      Smoking status: Never        Passive exposure: Never      Smokeless tobacco: Never       Objective   BP (!) 148/102   Ht 167.6 cm (66\")   Wt 81.6 kg (180 lb)   Breastfeeding Yes   BMI 29.05 kg/m²     General:  well developed; well " nourished  no acute distress  mentation appropriate   Abdomen: soft, non-tender; no masses  no umbilical or inguinal hernias are present  no hepato-splenomegaly  incision is healed.   Normal findings: soft   Pelvis: Not performed.       Diagnoses and all orders for this visit:    1. Postpartum follow-up (Primary)    2. Muscle spasm  -     cyclobenzaprine (FLEXERIL) 10 MG tablet; Take 1 tablet by mouth Every 8 (Eight) Hours As Needed for Muscle Spasms.  Dispense: 15 tablet; Refill: 0    3. Elevated blood pressure reading  -     Ambulatory Referral to Family Practice    4. Numbness and tingling of hand       Return in 3 months for annual exam or PRN.    This note was electronically signed.    Rossy Randolph, APRN  June 19, 2025

## 2025-06-20 ENCOUNTER — TELEPHONE (OUTPATIENT)
Dept: OBSTETRICS AND GYNECOLOGY | Age: 35
End: 2025-06-20
Payer: COMMERCIAL

## 2025-06-20 NOTE — TELEPHONE ENCOUNTER
Pt calling to report BP has continued to be elevated since OV 6/19. Pt reports BP have been 144/103, 155/96 last PM and today have been 144/103. Pt reports HA Wednesday night that was relieved by tylenol but denies any current HA, swelling or visual disturbances. Pt had OV 6/19 for PP visit. Pt was advised to f/u with PCP and referral placed to family practice. Spoke with JESSICA Caceres and she recommended to follow recommendations from visit and f/u with PCP for evaluation of BP. Pt given number per Dr. cM office and advise to f/u with her office. Pt advised with any symptoms of HA, VD, chest pain, SOB to go to urgent care or ER for evaluation of symptoms. Pt voices understanding.

## 2025-06-20 NOTE — PROGRESS NOTES
Nathalia Patino,   [facility]   Family Medicine  [address]  [place] [address]  Phone: [phone]  Fax: [phone]         Chief Complaint:  Chief Complaint   Patient presents with    Establish Care     High blood pressure        History:  Lee Irizarry is a 34 y.o. female who presents today as a new patient to the clinic. Current concerns include:     Recently postpartum (2025).  She is feeling well overall, other than some fatigue.  Partner works out of town, and so she has intermittent help at home.  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     BP at 6 week postpartum visit 148/102 (2 days ago). Seen at urgent care  for consistently elevated Bps at 144/103, 155/96 and, 144/103. Seen in the urgent care Friday, /100 and she was started on enalapril 5mg.  She feels like blood pressures have been much improved, more in the 130s over 80s.    She has not resumed her period, only describing spotting a couple weeks ago. She is rarely sexually active as partner works out of town. Does use barrier protection. Thinking about another child in 2 years or so.  she has had a previous colposcopy, has an upcoming Pap smear scheduled in a couple of months.    HPI        Past Medical History:   Past Medical History:   Diagnosis Date    Seasonal allergies        Past Surgical History:   Past Surgical History:   Procedure Laterality Date     SECTION N/A 2025    Procedure:  SECTION PRIMARY;  Surgeon: Marcin Davis MD;  Location: [facility] LABOR DELIVERY;  Service: Obstetrics/Gynecology;  Laterality: N/A;       Family History:   Family History   Problem Relation Age of Onset    Hypertension Mother     Asthma Mother     Hypertension Father        Social History:    reports that she has never smoked. She has never been exposed to tobacco smoke. She has never used smokeless tobacco. She reports that she does  "not drink alcohol and does not use drugs.    Current Medications:   Current Outpatient Medications   Medication Instructions    cetirizine (zyrTEC) 10 MG tablet 1 tablet, Daily    cyclobenzaprine (FLEXERIL) 10 mg, Oral, Every 8 Hours PRN    enalapril (VASOTEC) 5 mg, Oral, Daily    ferrous gluconate (FERGON) 324 mg, Daily    fluticasone (FLONASE) 50 MCG/ACT nasal spray 2 sprays, Daily    montelukast (SINGULAIR) 10 MG tablet 1 tablet, Daily    Prenatal Vit-Fe Fumarate-FA (Prenatal 19) chewable tablet 1 tablet, Oral, Daily       Allergies:   Allergies   Allergen Reactions    Amoxicillin Rash     unknown       OBJECTIVE:  /88   Pulse 78   Temp 97.1 °F (36.2 °C)   Ht 167.6 cm (66\")   Wt 81.2 kg (179 lb)   SpO2 98%   BMI 28.89 kg/m²      Gen: Well developed, well nourished female in no acute distress.  HEENT: Normocephalic. TM grey and pearly bilaterally. Normal external ear, nose. Oral mucosa is moist and pink. Posterior pharynx without erythema or exudate. PERRLA. Conjunctiva non injected.   Neck: Full range of motion. No lymphadenopathy. No masses or lesions noted.   Resp: Lungs are clear to auscultation bilaterally, no wheeze.  Normal respiratory effort.  CV: Regular rate and rhythm, no murmurs.  Abd: Soft, nontender.  Bowel sounds present.  No masses.  Neuro: Alert and oriented.  Ambulates without difficulty.  No focal neurologic deficits noted. Responds appropriately to all questions.   Skin: Warm, dry with no rashes or erythema. No lesions concerning of malignancy noted.   Ext: Normal strength, tone, and muscle mass without deformities.  No lower extremity edema.    Procedures    Assessment/Plan:     Diagnoses and all orders for this visit:    1. Essential hypertension (Primary)  Multiple elevated blood pressures since being postpartum.  No other symptoms including recurrent headache, vision changes, right upper quadrant abdominal pain.  Significant family history of CAD/hypertension.  Discussed " lifestyle changes including increased activity/diet.  Discussed option for pharmacologic intervention.  She was started on enalapril, but she is occasionally breast-feeding (she states 1-2 times weekly).  Discussed this medicine is not recommended for breast-feeding or potential future pregnancies.  She feels like it is doing well for her, would like to stay on it for now.  If breast feeding becomes more frequent or she is concerned for pregnancy, would encourage switching to labetalol/nifedipine.  -     enalapril (Vasotec) 5 MG tablet; Take 1 tablet by mouth Daily.  Dispense: 30 tablet; Refill: 1  -     Hemoglobin A1c; Future  -     TSH; Future  -     Comprehensive Metabolic Panel; Future  -     Lipid Panel; Future  -     CBC Auto Differential; Future  -     Vitamin D 25 hydroxy; Future    2. Postpartum care and examination  Overall doing well, aside from some fatigue.  She is breast-feeding occasionally (1-2 times weekly).  Notes that her white count at discharge was 26.  Will repeat that to assure improvement to baseline.  Her and her partner are not regularly sexually active, using barrier protection when needed.    3. Seasonal allergies  Doing well on intermittent Zyrtec, Flonase, Singulair.      An After Visit Summary was printed and given to the patient at discharge.  No follow-ups on file.         Nathalia Patino DO  6/23/2025   Electronically signed.

## 2025-06-23 ENCOUNTER — PATIENT ROUNDING (BHMG ONLY) (OUTPATIENT)
Dept: FAMILY MEDICINE CLINIC | Facility: CLINIC | Age: 35
End: 2025-06-23
Payer: COMMERCIAL

## 2025-06-23 ENCOUNTER — OFFICE VISIT (OUTPATIENT)
Dept: FAMILY MEDICINE CLINIC | Facility: CLINIC | Age: 35
End: 2025-06-23
Payer: COMMERCIAL

## 2025-06-23 VITALS
OXYGEN SATURATION: 98 % | HEIGHT: 66 IN | TEMPERATURE: 97.1 F | BODY MASS INDEX: 28.77 KG/M2 | WEIGHT: 179 LBS | HEART RATE: 78 BPM | DIASTOLIC BLOOD PRESSURE: 88 MMHG | SYSTOLIC BLOOD PRESSURE: 136 MMHG

## 2025-06-23 DIAGNOSIS — J30.2 SEASONAL ALLERGIES: ICD-10-CM

## 2025-06-23 DIAGNOSIS — I10 ESSENTIAL HYPERTENSION: Primary | ICD-10-CM

## 2025-06-23 PROCEDURE — 99214 OFFICE O/P EST MOD 30 MIN: CPT

## 2025-06-23 RX ORDER — ENALAPRIL MALEATE 5 MG/1
5 TABLET ORAL DAILY
Qty: 30 TABLET | Refills: 1 | Status: SHIPPED | OUTPATIENT
Start: 2025-06-23

## 2025-06-23 NOTE — PROGRESS NOTES
June 23, 2025    Hello, may I speak with Lee Irizarry?    My name is Sarah      I am  with [facility]  [address] [place] [id number] [phone].    Before we get started may I verify your date of birth? 1990    I am calling to officially welcome you to our practice and ask about your recent visit. Is this a good time to talk? yes    Tell me about your visit with us. What things went well?  She was great and listened. Very informative.       We're always looking for ways to make our patients' experiences even better. Do you have recommendations on ways we may improve?  no    Overall were you satisfied with your first visit to our practice? yes       I appreciate you taking the time to speak with me today. Is there anything else I can do for you? no      Thank you, and have a great day.

## 2025-07-09 ENCOUNTER — TELEPHONE (OUTPATIENT)
Dept: FAMILY MEDICINE CLINIC | Facility: CLINIC | Age: 35
End: 2025-07-09
Payer: COMMERCIAL

## 2025-07-09 DIAGNOSIS — M65.4 TENDINITIS, DE QUERVAIN'S: Primary | ICD-10-CM

## 2025-07-09 NOTE — TELEPHONE ENCOUNTER
Caller: Lee Irizarry    Relationship: Self    Best call back number:   Telephone Information:   Mobile [phone]       What is the best time to reach you: ANYTIME    Who are you requesting to speak with (clinical staff, provider,  specific staff member): CLINICAL      What was the call regarding: PAIN IN LEFT HAND, PREVIOUSLY DISCUSSED WITH DR. MENG ADVICE IF PAIN GOT WORSE TO REACH OUT FOR POSSIBLE SLING AND/OR  MEDICATION, PLEASE FOLLOW UP    Is it okay if the provider responds through MyChart: NO

## 2025-07-22 NOTE — PROGRESS NOTES
" Nathalia Patino DO  [facility]   Family Medicine  [address]  [place] [address]  Phone: [phone]  Fax: [phone]         Chief Complaint:  Chief Complaint   Patient presents with    Hypertension        History:  Lee Irizarry is a 34 y.o. female who presents for wrist pain follow-up.  Ongoing numbness at the tips of her middle, index and thumb on the left.  Has been using wrist braces intermittently.  Does have some improvement with ibuprofen.     Started on enalapril at last visit for elevated blood pressures.  Had continued breast-feeding, concern for interaction.she has not been taking her blood pressure regularly, but has not had the frequent headaches or fatigue that she has been experiencing.  When she has checked it it has been closer to 120/80.      HPI           reports that she has never smoked. She has never been exposed to tobacco smoke. She has never used smokeless tobacco. She reports that she does not drink alcohol and does not use drugs.    Current Outpatient Medications   Medication Instructions    cetirizine (zyrTEC) 10 MG tablet 1 tablet, Daily    cyclobenzaprine (FLEXERIL) 10 mg, Oral, Every 8 Hours PRN    enalapril (VASOTEC) 5 mg, Oral, Daily    ferrous gluconate (FERGON) 324 mg, Daily    fluticasone (FLONASE) 50 MCG/ACT nasal spray 2 sprays, Daily    methylPREDNISolone (MEDROL) 4 MG dose pack Take as directed on package instructions.    montelukast (SINGULAIR) 10 MG tablet 1 tablet, Daily    Prenatal Vit-Fe Fumarate-FA (Prenatal 19) chewable tablet 1 tablet, Oral, Daily       OBJECTIVE:  /74   Pulse 87   Temp 96.9 °F (36.1 °C) (Temporal)   Ht 167.6 cm (65.98\")   Wt 82.6 kg (182 lb)   SpO2 98%   BMI 29.39 kg/m²      Gen: No acute distress.   Head and neck: Normocephalic, atraumatic.  HEENT: PERRLA, EOMI.  CV: Well perfused, no pallor.   Resp: Normal respiratory effort. No distress.   Musculoskeletal: No gross deformity. Left hand with " subjective numbess to tips of index and middle finger. Negative tinel sign.   Neuro: Alert and oriented.   Skin: No visible rash or erythema.   Psych: Appropriate.       Procedures    Assessment/Plan:     Diagnoses and all orders for this visit:    1. Numbness of fingers (Primary)  Dequervains described at last visit, now more so carpal tunnel.  Has been using a wrist brace with intermittent improvement.  She does use ibuprofen when it gets significant, which she does feel like helps.  Encouraged using ibuprofen regularly for 5 days.  If no benefit, can take steroid pack as prescribed.  -     methylPREDNISolone (MEDROL) 4 MG dose pack; Take as directed on package instructions.  Dispense: 21 tablet; Refill: 0    2. Essential hypertension  Blood pressure stable over the last few weeks.  He is wanting to try to come off the enalapril.  Agreeable.  Discontinue and continue amatory blood pressure monitoring.  Encouraged reaching out in 2 weeks with a handful of blood pressures to assure stabilization.      An After Visit Summary was printed and given to the patient at discharge.  No follow-ups on file.         Nathalia Patino DO  7/25/2025   Electronically signed.

## 2025-07-25 ENCOUNTER — OFFICE VISIT (OUTPATIENT)
Dept: FAMILY MEDICINE CLINIC | Facility: CLINIC | Age: 35
End: 2025-07-25
Payer: COMMERCIAL

## 2025-07-25 VITALS
DIASTOLIC BLOOD PRESSURE: 74 MMHG | BODY MASS INDEX: 29.25 KG/M2 | WEIGHT: 182 LBS | OXYGEN SATURATION: 98 % | HEART RATE: 87 BPM | TEMPERATURE: 96.9 F | SYSTOLIC BLOOD PRESSURE: 120 MMHG | HEIGHT: 66 IN

## 2025-07-25 DIAGNOSIS — R20.0 NUMBNESS OF FINGERS: Primary | ICD-10-CM

## 2025-07-25 DIAGNOSIS — I10 ESSENTIAL HYPERTENSION: ICD-10-CM

## 2025-07-25 RX ORDER — METHYLPREDNISOLONE 4 MG/1
TABLET ORAL
Qty: 21 TABLET | Refills: 0 | Status: SHIPPED | OUTPATIENT
Start: 2025-07-25

## 2025-08-01 ENCOUNTER — TELEPHONE (OUTPATIENT)
Dept: FAMILY MEDICINE CLINIC | Facility: CLINIC | Age: 35
End: 2025-08-01
Payer: COMMERCIAL

## 2025-08-01 RX ORDER — ENALAPRIL MALEATE 5 MG/1
5 TABLET ORAL DAILY
Qty: 30 TABLET | Refills: 1 | Status: SHIPPED | OUTPATIENT
Start: 2025-08-01

## 2025-08-01 NOTE — TELEPHONE ENCOUNTER
Patient called stating she has not been taking her blood pressure meds and her bp has been elevated. I advised patient to restart blood pressure meds and limit caffeine. Told her to monitor blood pressure over the weekend, told her if any further instructions I would return call

## (undated) DEVICE — KENDALL SCD EXPRESS SLEEVES, KNEE LENGTH, LARGE: Brand: KENDALL SCD

## (undated) DEVICE — SUT VIC 0 CTX 36IN J978H

## (undated) DEVICE — GOWN,PREVENTION PLUS,XL,ST,24/CS: Brand: MEDLINE

## (undated) DEVICE — APPL CHLORAPREP HI/LITE 26ML ORNG

## (undated) DEVICE — PATIENT RETURN ELECTRODE, SINGLE-USE, CONTACT QUALITY MONITORING, ADULT, WITH 15 FT (4.5 M) CORD. FOR PATIENTS WEIGHING OVER 33LBS. (15KG): Brand: MEGADYNE

## (undated) DEVICE — 3M™ STERI-STRIP™ REINFORCED ADHESIVE SKIN CLOSURES, R1547, 1/2 IN X 4 IN (12 MM X 100 MM), 6 STRIPS/ENVELOPE: Brand: 3M™ STERI-STRIP™

## (undated) DEVICE — SUT GUT PLAIN TPR PT 2/0 27IN 53T

## (undated) DEVICE — ADHS LIQ MASTISOL 2/3ML

## (undated) DEVICE — Device

## (undated) DEVICE — SUT MNCRYL 0/0 CTX 36IN Y398H

## (undated) DEVICE — 3M™ STERI-STRIP™ BLEND TONE SKIN CLOSURES, B1557, TAN, 1/2 IN X 4 IN (12MM X 100MM), 6 STRIPS/ENVELOPE: Brand: 3M™ STERI-STRIP™

## (undated) DEVICE — CVR HNDL LIGHT RIGID

## (undated) DEVICE — 3M™ MEDIPORE™ H SOFT CLOTH SURGICAL TAPE 2868, 8 INCH X 10 YARD (20,3CM X 9,1M), 6 ROLLS/CASE: Brand: 3M™ MEDIPORE™

## (undated) DEVICE — GLOVE,SURG,SENSICARE SLT,LF,PF,8: Brand: MEDLINE

## (undated) DEVICE — PENCL SMOKE/EVAC MEGADYNE TELESCP 10FT

## (undated) DEVICE — SPNG GZ WOVN 4X4IN 12PLY 10/BX STRL

## (undated) DEVICE — LARGE, DISPOSABLE C-SECTION RETRACTOR: Brand: ALEXIS ® O C-SECTION PROTECTOR/RETRACTOR

## (undated) DEVICE — SUT VIC RAPD SZ 2/0 36IN CT1 VR945 BX/12

## (undated) DEVICE — SUT VIC RAPD SZ 3/0 27IN PS1 VR935

## (undated) DEVICE — PAD C-SECTION: Brand: MEDLINE INDUSTRIES, INC.